# Patient Record
Sex: FEMALE | Race: WHITE | Employment: FULL TIME | URBAN - METROPOLITAN AREA
[De-identification: names, ages, dates, MRNs, and addresses within clinical notes are randomized per-mention and may not be internally consistent; named-entity substitution may affect disease eponyms.]

---

## 2017-02-17 ENCOUNTER — HOSPITAL ENCOUNTER (OUTPATIENT)
Dept: ULTRASOUND IMAGING | Age: 62
Discharge: HOME | End: 2017-02-17
Payer: COMMERCIAL

## 2017-02-17 DIAGNOSIS — N95.0 POSTMENOPAUSAL VAGINAL BLEEDING: ICD-10-CM

## 2017-02-17 DIAGNOSIS — N95.0 POSTMENOPAUSAL BLEEDING: ICD-10-CM

## 2017-02-17 PROCEDURE — 76856 US EXAM PELVIC COMPLETE: CPT | Performed by: RADIOLOGY

## 2017-02-17 PROCEDURE — 76856 US EXAM PELVIC COMPLETE: CPT

## 2017-02-17 PROCEDURE — 76830 TRANSVAGINAL US NON-OB: CPT

## 2017-02-17 PROCEDURE — 76830 TRANSVAGINAL US NON-OB: CPT | Performed by: RADIOLOGY

## 2019-04-20 ENCOUNTER — APPOINTMENT (OUTPATIENT)
Dept: CT IMAGING | Age: 64
End: 2019-04-20
Payer: COMMERCIAL

## 2019-04-20 ENCOUNTER — HOSPITAL ENCOUNTER (EMERGENCY)
Age: 64
Discharge: HOME | End: 2019-04-20
Attending: EMERGENCY MEDICINE
Payer: COMMERCIAL

## 2019-04-20 ENCOUNTER — APPOINTMENT (OUTPATIENT)
Dept: GENERAL RADIOLOGY | Age: 64
End: 2019-04-20
Payer: COMMERCIAL

## 2019-04-20 VITALS
SYSTOLIC BLOOD PRESSURE: 147 MMHG | OXYGEN SATURATION: 97 % | WEIGHT: 205 LBS | HEART RATE: 58 BPM | DIASTOLIC BLOOD PRESSURE: 73 MMHG | RESPIRATION RATE: 15 BRPM | HEIGHT: 65 IN | BODY MASS INDEX: 34.16 KG/M2 | TEMPERATURE: 97.9 F

## 2019-04-20 DIAGNOSIS — R42 DIZZINESS: Primary | ICD-10-CM

## 2019-04-20 DIAGNOSIS — N30.00 ACUTE CYSTITIS WITHOUT HEMATURIA: ICD-10-CM

## 2019-04-20 LAB
-: ABNORMAL
ABSOLUTE EOS #: 0.1 K/UL (ref 0–0.4)
ABSOLUTE IMMATURE GRANULOCYTE: ABNORMAL K/UL (ref 0–0.3)
ABSOLUTE LYMPH #: 0.7 K/UL (ref 1–4.8)
ABSOLUTE MONO #: 0.3 K/UL (ref 0.1–1.3)
ALBUMIN SERPL-MCNC: 3.9 G/DL (ref 3.5–5.2)
ALBUMIN/GLOBULIN RATIO: ABNORMAL (ref 1–2.5)
ALP BLD-CCNC: 104 U/L (ref 35–104)
ALT SERPL-CCNC: 11 U/L (ref 5–33)
AMORPHOUS: ABNORMAL
ANION GAP SERPL CALCULATED.3IONS-SCNC: 11 MMOL/L (ref 9–17)
AST SERPL-CCNC: 16 U/L
BACTERIA: ABNORMAL
BASOPHILS # BLD: 1 % (ref 0–2)
BASOPHILS ABSOLUTE: 0 K/UL (ref 0–0.2)
BILIRUB SERPL-MCNC: 0.54 MG/DL (ref 0.3–1.2)
BILIRUBIN URINE: NEGATIVE
BNP INTERPRETATION: NORMAL
BUN BLDV-MCNC: 17 MG/DL (ref 8–23)
BUN/CREAT BLD: ABNORMAL (ref 9–20)
CALCIUM SERPL-MCNC: 9.5 MG/DL (ref 8.6–10.4)
CASTS UA: ABNORMAL /LPF
CHLORIDE BLD-SCNC: 109 MMOL/L (ref 98–107)
CO2: 23 MMOL/L (ref 20–31)
COLOR: YELLOW
COMMENT UA: ABNORMAL
CREAT SERPL-MCNC: 0.71 MG/DL (ref 0.5–0.9)
CRYSTALS, UA: ABNORMAL /HPF
DIFFERENTIAL TYPE: ABNORMAL
EOSINOPHILS RELATIVE PERCENT: 2 % (ref 0–4)
EPITHELIAL CELLS UA: ABNORMAL /HPF
GFR AFRICAN AMERICAN: >60 ML/MIN
GFR NON-AFRICAN AMERICAN: >60 ML/MIN
GFR SERPL CREATININE-BSD FRML MDRD: ABNORMAL ML/MIN/{1.73_M2}
GFR SERPL CREATININE-BSD FRML MDRD: ABNORMAL ML/MIN/{1.73_M2}
GLUCOSE BLD-MCNC: 119 MG/DL (ref 70–99)
GLUCOSE BLD-MCNC: 123 MG/DL (ref 65–105)
GLUCOSE URINE: NEGATIVE
HCT VFR BLD CALC: 43.1 % (ref 36–46)
HEMOGLOBIN: 14.6 G/DL (ref 12–16)
IMMATURE GRANULOCYTES: ABNORMAL %
INR BLD: 1
KETONES, URINE: NEGATIVE
LEUKOCYTE ESTERASE, URINE: ABNORMAL
LIPASE: 34 U/L (ref 13–60)
LYMPHOCYTES # BLD: 16 % (ref 24–44)
MCH RBC QN AUTO: 31.5 PG (ref 26–34)
MCHC RBC AUTO-ENTMCNC: 33.8 G/DL (ref 31–37)
MCV RBC AUTO: 93 FL (ref 80–100)
MONOCYTES # BLD: 6 % (ref 1–7)
MUCUS: ABNORMAL
NITRITE, URINE: POSITIVE
NRBC AUTOMATED: ABNORMAL PER 100 WBC
OTHER OBSERVATIONS UA: ABNORMAL
PDW BLD-RTO: 13.3 % (ref 11.5–14.9)
PH UA: 7.5 (ref 5–8)
PLATELET # BLD: 172 K/UL (ref 150–450)
PLATELET ESTIMATE: ABNORMAL
PMV BLD AUTO: 8.7 FL (ref 6–12)
POTASSIUM SERPL-SCNC: 4 MMOL/L (ref 3.7–5.3)
PRO-BNP: 182 PG/ML
PROTEIN UA: NEGATIVE
PROTHROMBIN TIME: 12.8 SEC (ref 11.8–14.6)
RBC # BLD: 4.64 M/UL (ref 4–5.2)
RBC # BLD: ABNORMAL 10*6/UL
RBC UA: ABNORMAL /HPF
RENAL EPITHELIAL, UA: ABNORMAL /HPF
SEG NEUTROPHILS: 75 % (ref 36–66)
SEGMENTED NEUTROPHILS ABSOLUTE COUNT: 3.4 K/UL (ref 1.3–9.1)
SODIUM BLD-SCNC: 143 MMOL/L (ref 135–144)
SPECIFIC GRAVITY UA: 1.01 (ref 1–1.03)
TOTAL PROTEIN: 7.2 G/DL (ref 6.4–8.3)
TRICHOMONAS: ABNORMAL
TROPONIN INTERP: NORMAL
TROPONIN T: NORMAL NG/ML
TROPONIN, HIGH SENSITIVITY: 8 NG/L (ref 0–14)
TURBIDITY: ABNORMAL
URINE HGB: ABNORMAL
UROBILINOGEN, URINE: NORMAL
WBC # BLD: 4.4 K/UL (ref 3.5–11)
WBC # BLD: ABNORMAL 10*3/UL
WBC UA: ABNORMAL /HPF
YEAST: ABNORMAL

## 2019-04-20 PROCEDURE — 93005 ELECTROCARDIOGRAM TRACING: CPT

## 2019-04-20 PROCEDURE — 70450 CT HEAD/BRAIN W/O DYE: CPT

## 2019-04-20 PROCEDURE — 6360000002 HC RX W HCPCS: Performed by: PHYSICIAN ASSISTANT

## 2019-04-20 PROCEDURE — 6370000000 HC RX 637 (ALT 250 FOR IP): Performed by: PHYSICIAN ASSISTANT

## 2019-04-20 PROCEDURE — 80053 COMPREHEN METABOLIC PANEL: CPT

## 2019-04-20 PROCEDURE — 85610 PROTHROMBIN TIME: CPT

## 2019-04-20 PROCEDURE — 85025 COMPLETE CBC W/AUTO DIFF WBC: CPT

## 2019-04-20 PROCEDURE — 99284 EMERGENCY DEPT VISIT MOD MDM: CPT

## 2019-04-20 PROCEDURE — 84484 ASSAY OF TROPONIN QUANT: CPT

## 2019-04-20 PROCEDURE — 96374 THER/PROPH/DIAG INJ IV PUSH: CPT

## 2019-04-20 PROCEDURE — 2580000003 HC RX 258: Performed by: PHYSICIAN ASSISTANT

## 2019-04-20 PROCEDURE — 83690 ASSAY OF LIPASE: CPT

## 2019-04-20 PROCEDURE — 82947 ASSAY GLUCOSE BLOOD QUANT: CPT

## 2019-04-20 PROCEDURE — 81001 URINALYSIS AUTO W/SCOPE: CPT

## 2019-04-20 PROCEDURE — 36415 COLL VENOUS BLD VENIPUNCTURE: CPT

## 2019-04-20 PROCEDURE — 83880 ASSAY OF NATRIURETIC PEPTIDE: CPT

## 2019-04-20 PROCEDURE — 71046 X-RAY EXAM CHEST 2 VIEWS: CPT

## 2019-04-20 RX ORDER — ONDANSETRON 4 MG/1
4 TABLET, FILM COATED ORAL EVERY 8 HOURS PRN
Qty: 10 TABLET | Refills: 0 | Status: SHIPPED | OUTPATIENT
Start: 2019-04-20 | End: 2020-09-21 | Stop reason: ALTCHOICE

## 2019-04-20 RX ORDER — 0.9 % SODIUM CHLORIDE 0.9 %
1000 INTRAVENOUS SOLUTION INTRAVENOUS ONCE
Status: COMPLETED | OUTPATIENT
Start: 2019-04-20 | End: 2019-04-20

## 2019-04-20 RX ORDER — ONDANSETRON 2 MG/ML
4 INJECTION INTRAMUSCULAR; INTRAVENOUS ONCE
Status: COMPLETED | OUTPATIENT
Start: 2019-04-20 | End: 2019-04-20

## 2019-04-20 RX ORDER — MECLIZINE HCL 12.5 MG/1
12.5 TABLET ORAL 3 TIMES DAILY PRN
Qty: 12 TABLET | Refills: 0 | Status: SHIPPED | OUTPATIENT
Start: 2019-04-20 | End: 2019-04-30

## 2019-04-20 RX ORDER — DIAZEPAM 5 MG/1
5 TABLET ORAL ONCE
Status: COMPLETED | OUTPATIENT
Start: 2019-04-20 | End: 2019-04-20

## 2019-04-20 RX ORDER — NITROFURANTOIN 25; 75 MG/1; MG/1
100 CAPSULE ORAL 2 TIMES DAILY
Qty: 10 CAPSULE | Refills: 0 | Status: SHIPPED | OUTPATIENT
Start: 2019-04-20 | End: 2019-04-25

## 2019-04-20 RX ADMIN — DIAZEPAM 5 MG: 5 TABLET ORAL at 11:12

## 2019-04-20 RX ADMIN — ONDANSETRON 4 MG: 2 INJECTION INTRAMUSCULAR; INTRAVENOUS at 11:12

## 2019-04-20 RX ADMIN — SODIUM CHLORIDE 1000 ML: 9 INJECTION, SOLUTION INTRAVENOUS at 11:12

## 2019-04-20 ASSESSMENT — ENCOUNTER SYMPTOMS
VOMITING: 1
VISUAL CHANGE: 0
SHORTNESS OF BREATH: 0

## 2019-04-20 ASSESSMENT — PAIN DESCRIPTION - PAIN TYPE: TYPE: ACUTE PAIN

## 2019-04-20 ASSESSMENT — PAIN SCALES - GENERAL: PAINLEVEL_OUTOF10: 5

## 2019-04-20 ASSESSMENT — PAIN DESCRIPTION - LOCATION: LOCATION: HEAD

## 2019-04-20 NOTE — ED PROVIDER NOTES
[address]  eMERGENCY dEPARTMENT eNCOUnter      Pt Name: Aleksandra Pearce  MRN: [id number]  [place] 1955  Date of evaluation: 4/20/19      CHIEF COMPLAINT       Chief Complaint   Patient presents with    Dizziness     pt reports intermittent dizziness onset 2 months PTA    Emesis    Headache         HISTORY OF PRESENT ILLNESS    Aleksandra Pearce is a 71 y.o. female who presents complaining of dizziness and vomiting  The history is provided by the patient. Dizziness   Quality:  Head spinning and room spinning  Severity:  Mild  Onset quality:  Sudden  Duration:  4 hours  Timing:  Intermittent  Progression:  Waxing and waning  Chronicity:  Recurrent  Context: head movement    Context: not with loss of consciousness    Context comment:  Worse with closing eyes, lying down  Relieved by:  Being still  Worsened by:  Nothing  Ineffective treatments:  None tried  Associated symptoms: headaches and vomiting    Associated symptoms: no shortness of breath and no vision changes        REVIEW OF SYSTEMS       Review of Systems   Respiratory: Negative for shortness of breath. Gastrointestinal: Positive for vomiting. Neurological: Positive for dizziness and headaches. All other systems reviewed and are negative. PAST MEDICAL HISTORY   History reviewed. No pertinent past medical history. SURGICAL HISTORY       Past Surgical History:   Procedure Laterality Date    HYSTEROSCOPY  04/05/2017   [facility]GERY         CURRENT MEDICATIONS       Previous Medications    No medications on file       ALLERGIES     is allergic to penicillins. FAMILY HISTORY     has no family status information on file. SOCIAL HISTORY      reports that she has never smoked. She has never used smokeless tobacco. She reports that she does not drink alcohol or use drugs.     PHYSICAL EXAM     INITIAL VITALS: BP (!) 147/73   Pulse 58   Temp 97.9 °F (36.6 °C) (Oral)   Resp 15   Ht 5' 5\" (1.651 m)   Wt 205 lb (93 kg)   SpO2 97% BMI 34.11 kg/m²      Physical Exam   Constitutional: She is oriented to person, place, and time. She appears well-developed. No distress. HENT:   Head: Normocephalic. Right Ear: Hearing, tympanic membrane, external ear and ear canal normal.   Left Ear: Hearing, tympanic membrane, external ear and ear canal normal.   Mouth/Throat: Uvula is midline, oropharynx is clear and moist and mucous membranes are normal. No oropharyngeal exudate, posterior oropharyngeal edema, posterior oropharyngeal erythema or tonsillar abscesses. Eyes: Pupils are equal, round, and reactive to light. EOM are normal.   Neck: Normal range of motion. Cardiovascular: Normal rate, regular rhythm, normal heart sounds and intact distal pulses. Pulmonary/Chest: Effort normal. No respiratory distress. Abdominal: Soft. Bowel sounds are normal.   Musculoskeletal: Normal range of motion. She exhibits no edema or tenderness. Lymphadenopathy:     She has no cervical adenopathy. Neurological: She is alert and oriented to person, place, and time. She displays normal reflexes. No cranial nerve deficit or sensory deficit. She exhibits normal muscle tone. Coordination normal.   Skin: Skin is warm and dry. Capillary refill takes less than 2 seconds. She is not diaphoretic. Nursing note and vitals reviewed. MEDICAL DECISION MAKING:     Reexamination patient reports she feels much better and she was able to tolerate lying flat walking to the restroom. While in the emergency department she is given IV fluids and Zofran and Valium. At this time I feel is safe to discharge patient home diagnosis of vertigo we will start her on meclizine 1 tablet 3 times a day as needed for dizziness also start her on Zofran as needed for nausea. Recommend close follow-up with primary care provider rise from seated position or change position slowly.   Increase clear liquids and return to the emergency department for any worsening of symptoms or any other concerns. DIAGNOSTIC RESULTS     EKG: All EKG's are interpreted by the Emergency Department Physician who either signs or Co-signs this chart in the absence of acardiologist.    Narrative   Performed by: [facility]   Sinus bradycardia  Otherwise normal ECG  When compared with ECG of 27-MAR-2011 12:58,  No significant change was found   Lab and Collection         RADIOLOGY:Allplain film, CT, MRI, and formal ultrasound images (except ED bedside ultrasound) are read by the radiologist and the images and interpretations are directly viewed by the emergency physician. LABS:All lab results were reviewed by myself, and all abnormals are listed below.   Labs Reviewed   CBC WITH AUTO DIFFERENTIAL - Abnormal; Notable for the following components:       Result Value    Seg Neutrophils 75 (*)     Lymphocytes 16 (*)     Absolute Lymph # 0.70 (*)     All other components within normal limits   COMPREHENSIVE METABOLIC PANEL W/ REFLEX TO MG FOR LOW K - Abnormal; Notable for the following components:    Glucose 119 (*)     Chloride 109 (*)     All other components within normal limits   URINALYSIS - Abnormal; Notable for the following components:    Turbidity UA CLOUDY (*)     Urine Hgb TRACE (*)     Nitrite, Urine POSITIVE (*)     Leukocyte Esterase, Urine LARGE (*)     All other components within normal limits   MICROSCOPIC URINALYSIS - Abnormal; Notable for the following components:    Bacteria, UA MODERATE (*)     Mucus, UA 1+ (*)     All other components within normal limits   POC GLUCOSE FINGERSTICK - Abnormal; Notable for the following components:    POC Glucose 123 (*)     All other components within normal limits   LIPASE   TROPONIN   PROTIME-INR   BRAIN NATRIURETIC PEPTIDE         EMERGENCY DEPARTMENT COURSE:   Vitals:    Vitals:    04/20/19 1024 04/20/19 1143   BP: (!) 147/73    Pulse: 58    Resp: 15    Temp: 97.9 °F (36.6 °C)    TempSrc: Oral    SpO2: 96% 97%   Weight: 205 lb (93 kg)    Height: 5' 5\" (1.651 m)        The patient was given the following medications while in the emergency department:  Orders Placed This Encounter   Medications    0.9 % sodium chloride bolus    ondansetron (ZOFRAN) injection 4 mg    diazepam (VALIUM) tablet 5 mg    meclizine (ANTIVERT) 12.5 MG tablet     Sig: Take 1 tablet by mouth 3 times daily as needed for Dizziness or Nausea     Dispense:  12 tablet     Refill:  0    ondansetron (ZOFRAN) 4 MG tablet     Sig: Take 1 tablet by mouth every 8 hours as needed for Nausea     Dispense:  10 tablet     Refill:  0    nitrofurantoin, macrocrystal-monohydrate, (MACROBID) 100 MG capsule     Sig: Take 1 capsule by mouth 2 times daily for 5 days     Dispense:  10 capsule     Refill:  0       -------------------------      CRITICAL CARE:    CONSULTS:  None    PROCEDURES:  Procedures    FINAL IMPRESSION      1. Dizziness    2.  Acute cystitis without hematuria          DISPOSITION/PLAN   DISPOSITION        PATIENT REFERREDTO:  Ed Shultz, [address] [facility]  [address] [place] [address]  [phone]    Schedule an appointment as soon as possible for a visit in 2 days      [address] [place], [place] [address] [phone]    If symptoms worsen      DISCHARGEMEDICATIONS:  New Prescriptions    MECLIZINE (ANTIVERT) 12.5 MG TABLET    Take 1 tablet by mouth 3 times daily as needed for Dizziness or Nausea    NITROFURANTOIN, MACROCRYSTAL-MONOHYDRATE, (MACROBID) 100 MG CAPSULE    Take 1 capsule by mouth 2 times daily for 5 days    ONDANSETRON (ZOFRAN) 4 MG TABLET    Take 1 tablet by mouth every 8 hours as needed for Nausea       (Please note that portions of this note were completed with a voice recognition program.  Efforts were made to edit thedictations but occasionally words are mis-transcribed.)    SUMAN Beltran PA-C  04/20/19 [facility]  04/20/19 1686

## 2019-05-15 LAB
EKG ATRIAL RATE: 50 BPM
EKG P AXIS: 67 DEGREES
EKG P-R INTERVAL: 182 MS
EKG Q-T INTERVAL: 454 MS
EKG QRS DURATION: 94 MS
EKG QTC CALCULATION (BAZETT): 413 MS
EKG R AXIS: -3 DEGREES
EKG T AXIS: 59 DEGREES
EKG VENTRICULAR RATE: 50 BPM

## 2019-06-04 ENCOUNTER — HOSPITAL ENCOUNTER (OUTPATIENT)
Dept: PHYSICAL THERAPY | Age: 64
Setting detail: THERAPIES SERIES
Discharge: HOME | End: 2019-06-04
Payer: COMMERCIAL

## 2019-06-04 PROCEDURE — 97162 PT EVAL MOD COMPLEX 30 MIN: CPT

## 2019-06-04 PROCEDURE — 97110 THERAPEUTIC EXERCISES: CPT

## 2019-06-04 PROCEDURE — 97530 THERAPEUTIC ACTIVITIES: CPT

## 2019-06-04 NOTE — PROGRESS NOTES
Other: CT OF HEAD 4/20/2019: NO ACUTE INTRACRANIAL ABNORMALITY. Medications: [x] Refer to full medical record [] None [x] Other:GOT RX FOR ANTIVERT BUT NOT TAKING  Allergies:      [x] Refer to full medical record: MEDIATION [] None [] Other:    Working:  [] Normal Duty  [] Light Duty  [] Off D/T Condition  [] Retired  [] Not Employed                  []  Disability  [x] Other: OFF FOR SUMMER BREAK          Return to work: [address] [employer]   Job/ADL Description: WORKS FOR [employer] IN [employer] AND IS OFF [employer]. SHE DENIES SYMPTOMS WHILE DRIVING. SHE LIVES WITH HER  IN A HOUSE WITH STEPS TO ENTER AND A BASEMENT. SHE IS DOING WELL ON THE STAIRS. PATIENT REPORTS SHE IS ACTIVE BUT IS NOT AN EXERCISER. Pain:  [x] Yes  [] No Location: LEFT SIDE HEAD PRESSURE X1 WEEK Pain Rating: (0-10 scale) 1/10    Precautions: FALL RISK  Fall History: 3 FALLS IN THE LAST ONE YEAR, FALLS OCCUR WHEN SHE HAS VERTIGO AND TURNS HER HEAD RESULTING IN LOSS OF BALANCE.     Objective:    OBSERVATION No Deficit Deficit Not Tested Comments   Posture       Forward Head [] [x] [] LATERALLY FLEXED LEFT   Rounded Shoulders [] [x] [] R>L   Kyphosis [] [x] [] INCREASED   Lordosis [x] [] []    Lateral Shift [x] [] []    Scoliosis [x] [] []    Iliac Crest [x] [] []    PSIS [x] [] []    ASIS [] [] [x]    Genu Valgus [x] [] []    Genu Varus [x] [] []    Genu Recurvatum [x] [] []    Sensation [x] [] [] DENIES NUMBNESS   Edema [] [x] [] LE   Neurological Signs [] [x] [] SLOW L LE FELIX, DIFFICULTY WITH SHIN SLIDE LEFT, GOOD UE FELIX AND FINGER TO NOSE   Strength   X    Gait  X  SLOW, ANTALGIC, NO ASSISTIVE DEVICE     COMMENTS:  PTOSIS LEFT EYE       AROM Comments   Cervical  REPORTS CREPITUS AND STIFFNESS WITH AROM, ROM IS SLOW AND GUARDED DUE TO FEAR OF VERTIGO   Flexion 75% OF NORMAL REPORTS MILD DIZZINESS   Extension 50%    Rotation (L) 50% (R) 50%    Sidebend (L) 50% (R) 50%    Retraction NOT TESTED      Special Testing: [x] DIZZINESS HANDICAP INVENTORY ([address] [place] [address]): 34/100   [x] ACTIVITIES BALANCE CONFIDENCE SCALE ([facility]): 68.75% CONFIDENT  [] Other:      VESTIBULAR SPECIAL TESTS:      ALAR LIGAMENT (-) LAXITY   TRANSVERSE LIGAMENT/ SHARP ZAYNAB (-) LAXITY   NECK TORSION TEST NOT TESTED   Vitals    NOT TESTED   SITTING             BP:                         HR:      SIT->STAND      BP:                         HR:      SIT->SUPINE     BP:                         HR:      SUPINE->SIT     BP:                         HR:             VESTIBULAR BALANCE SCREEN:    TEST EYES OPEN EYES CLOSED   ROMBERG >30' WITH MILD TO MODERATE POSTURAL SWAY >30' WITH MODERATE POSTURAL SWAY   TANDEM ROMBERG NOT TESTED NT   FEET TOGETHER: FOAM SURFACE >30' WITH EXCESSIVE POSTURAL SWAY AND HIP BALANCE STRATEGY OBSERVED 23\" WITH EXCESSIVE POSTURAL SWAY AND LOSS OF BALANCE.      ONE LEGGED STANCE       FUNCTIONAL GAIT ASSESSMENT (FGA): TEST NEXT SESSION IN INDICATED BY POSITION TESTS        VESTIBULAR OCULOMOTOR SCREENING:    TEST ROOM LIGHT WITHOUT FIXATION-IR GOGGLES   MODIFIED VERTEBRAL ARTERY TEST IN SITTING NO REPORTS OF SYMPTOMS    SPONTANEOUS NYSTAGMUS NEGATIVE    GAZE HOLDING NYSTAGMUS (L) NEG (L) MILD DOWN BEATING NYSTAGMUS    (R) NEG (R) MILD DOWN BEATING NYSTAGMUS    UP: NEG UP: MILD DOWN BEATING NYSTAGMUS   SMOOTH PURSUIT NEG    SACCADES NEG    VOR CANCELLATION NEG    VOR TO SLOW HEAD MOVEMENT NEG    VOR TO FAST HEAD MOVEMENT(HEAD THRUST) (L) NOT TESTED DUE TO CERVICAL SYMPTOMS     (R)  NT    HEAD SHAKING NYSTAGMUS  NOT TESTED DUE TO BASELINE OF SPONTANEOUS NYSTAGMUS WITH FIXATION REMOVED   VALSALVA/HYPER VENTILATION  NEG/NEG   RIGHT LUIS FELIPE-HALLPIKE  PATIENT STATES SHE IS UNABLE TO TOLERATE THE TEST POSITION   LEFT LUIS FELIPE-HALLPIKE  PATIENT STATES SHE IS UNABLE TO TOLERATE THE TEST POSITION   SUPINE HEAD CENTER  PATIENT STATES SHE IS UNABLE TO TOLERATE THE TEST POSITION   ROLL TEST (L)  (L) PATIENT STATES SHE IS UNABLE TO TOLERATE THE TEST POSITION    (R)  (R) PATIENT STATES SHE IS UNABLE TO TOLERATE THE TEST POSITION   BOW AND LEAN TEST  BOW: NEGATIVE FOR BPPV, PATIENT REPORTED BRIEF VERTIGO  LEAN: NEGATIVE FOR BPPV   SIDE LYING TEST (L)  (L) PATIENT STATES SHE IS UNABLE TO TOLERATE THE TEST POSITION    (R)  (R) PATIENT STATES SHE IS UNABLE TO TOLERATE THE TEST POSITION   VISUAL ACUITY STATIC:  DYNAMIC: N/A AT THIS TIME       Assessment:  Problems:    [x] Vertigo:     [x] Difficulty Walking a Straight Course:    [x] Static Balance Deficit:    [x] Dynamic Balance deficit: Functional Gait Assessment   [x] Positive Kari Hallpike or Roll Test for BPPV: TBD (+) BPPV SYMPTOMS BUT PATIENT FEARFUL OF THE TEST  [x] ? Function: Dizziness Handicap Inventory: 34/100  [x] Gait Deviations:  [x] Other: (+) CENTRAL SIGN OF DOWN BEATING NYSTAGMUS, (+) MIGRAINE HEADACHES, (+) ANXIETY, (+) MOTION SENSITIVITY,       STG: (to be met in 3-6 treatments)  1. Patient will report 7 days with no vertigo  2. Patient will report 75% improvement in dizziness and imbalance  3. Improve balance: Increase Functional Gait Assessment (FGA) TBD  4. Negative Kari Hallpike and Roll Test for BPPV  5. Improve Function: Decrease Dizziness Handicap Inventory (DHI) (34/100) BY 20  6. Independent with Home Exercise Programs  7. Demonstrate Knowledge of fall prevention  LTG: (to be met in TBD treatments)  1. Patient goals: \"BE ABLE TO LAY DOWN FLAT, FEEL BETTER, STOP WIGGLING OF HEAD\"    Rehab Potential:  [x] Good  [] Fair  [] Poor   Suggested Professional Referral:  [x] No  [] Yes:  Barriers to Goal Achievement[de-identified]  [] No  [x] Yes: ANXIETY  Pt. Education:  [x] Plans/Goals, Risks/Benefits discussed, results of clinical tests    [x] Home exercise program  Method of Education: [x] Verbal  [x] Demo  [x] Written  Comprehension of Education:  [x] Verbalizes understanding. [x] Demonstrates understanding. [] Needs Review.     Treatment Plan:  [] Therapeutic Exercise     [x] Vestibular Rehabilitation  [] Therapeutic Activity       [] Gait Training     [] Modalities      [] Neuromuscular Re-education [] Cold/hotpack   [] Instruction in HEP               [] Manual Therapy               [] Aquatic Therapy  [] Other:    Frequency: 1-2 x/weeks for 3-6 visits    Todays Treatment:  Exercises: HOME EXERCISE INSTRUCTION  Exercise Reps/ Time Weight/ Level Comments   CERVICAL AROM DUE TO STIFF NECK 5\" X5 EA  BID HEP                           Other: PATIENT EDUCATION REGARDING, POC. DISCUSSED HOW ANXIETY MAGNIFIES SYMPTOMS. DISCUSSED MOTION SENSITIVITY MAKES VERTIGO FEEL MORE INTENSE. Specific Instructions for next treatment:  PATIENT WAS INSTRUCTED TO RETURN NEXT APPOINTMENT WITH HER SPOUSE FOR EMOTIONAL SUPPORT AND TO BE THE . DISCUSSED PREMEDICATION (30') PRIOR TO THE APPOINTMENT WITH ANTI-EMETICS AND/OR VALIUM WHICH MAY HELP HER TOLERATED THE VERTIGO AND NAUSEA AND PREVENT VOMITING. SHE PLANS TO DISCUSS THIS WITH HER PCP. Treatment Charges: Mins Units   [x] Evaluation       []  Low       [x]  Moderate       []  High  1   []  Canalith Repositioning Maneuver     [x]  Ther Exercise 10 1   []  Manual Therapy     [x]  Ther Activities 15 1   []  Neuromuscular     []  Other     TOTAL 80 3     Time in: 8490     Time out: 0549    Electronically signed by: Toi Aschoff, PT    Physician Signature:________________________________Date:__________________  By signing above or cosigning this note, I have reviewed this plan of care and certify a need for medically necessary rehabilitation services.

## 2019-06-07 ENCOUNTER — HOSPITAL ENCOUNTER (OUTPATIENT)
Age: 64
Discharge: HOME | End: 2019-06-07
Payer: COMMERCIAL

## 2019-06-07 DIAGNOSIS — Z13.1 ENCOUNTER FOR SCREENING FOR DIABETES MELLITUS: ICD-10-CM

## 2019-06-07 DIAGNOSIS — Z13.220 SCREENING FOR HYPERLIPIDEMIA: ICD-10-CM

## 2019-06-07 LAB
CHOLESTEROL, FASTING: 211 MG/DL
CHOLESTEROL/HDL RATIO: 3.4
GLUCOSE FASTING: 88 MG/DL (ref 70–99)
HDLC SERPL-MCNC: 62 MG/DL
LDL CHOLESTEROL: 129 MG/DL (ref 0–130)
TRIGLYCERIDE, FASTING: 100 MG/DL
VLDLC SERPL CALC-MCNC: ABNORMAL MG/DL (ref 1–30)

## 2019-06-07 PROCEDURE — 82947 ASSAY GLUCOSE BLOOD QUANT: CPT

## 2019-06-07 PROCEDURE — 36415 COLL VENOUS BLD VENIPUNCTURE: CPT

## 2019-06-07 PROCEDURE — 80061 LIPID PANEL: CPT

## 2019-06-13 ENCOUNTER — APPOINTMENT (OUTPATIENT)
Dept: PHYSICAL THERAPY | Age: 64
End: 2019-06-13
Payer: COMMERCIAL

## 2020-07-03 ENCOUNTER — APPOINTMENT (OUTPATIENT)
Dept: GENERAL RADIOLOGY | Age: 65
End: 2020-07-03
Payer: COMMERCIAL

## 2020-07-03 ENCOUNTER — HOSPITAL ENCOUNTER (OUTPATIENT)
Age: 65
Setting detail: OBSERVATION
Discharge: HOME | End: 2020-07-04
Attending: EMERGENCY MEDICINE | Admitting: FAMILY MEDICINE
Payer: COMMERCIAL

## 2020-07-03 PROBLEM — R07.9 CHEST PAIN: Status: ACTIVE | Noted: 2020-07-03

## 2020-07-03 LAB
ABSOLUTE EOS #: 0.2 K/UL (ref 0–0.4)
ABSOLUTE IMMATURE GRANULOCYTE: ABNORMAL K/UL (ref 0–0.3)
ABSOLUTE LYMPH #: 1.2 K/UL (ref 1–4.8)
ABSOLUTE MONO #: 0.5 K/UL (ref 0.1–1.3)
ALBUMIN SERPL-MCNC: 3.9 G/DL (ref 3.5–5.2)
ALBUMIN/GLOBULIN RATIO: ABNORMAL (ref 1–2.5)
ALP BLD-CCNC: 113 U/L (ref 35–104)
ALT SERPL-CCNC: 13 U/L (ref 5–33)
ANION GAP SERPL CALCULATED.3IONS-SCNC: 10 MMOL/L (ref 9–17)
AST SERPL-CCNC: 19 U/L
BASOPHILS # BLD: 1 % (ref 0–2)
BASOPHILS ABSOLUTE: 0 K/UL (ref 0–0.2)
BILIRUB SERPL-MCNC: 0.44 MG/DL (ref 0.3–1.2)
BNP INTERPRETATION: NORMAL
BUN BLDV-MCNC: 17 MG/DL (ref 8–23)
BUN/CREAT BLD: ABNORMAL (ref 9–20)
C-REACTIVE PROTEIN: 5.4 MG/L (ref 0–5)
CALCIUM SERPL-MCNC: 9.2 MG/DL (ref 8.6–10.4)
CHLORIDE BLD-SCNC: 103 MMOL/L (ref 98–107)
CO2: 26 MMOL/L (ref 20–31)
CREAT SERPL-MCNC: 0.71 MG/DL (ref 0.5–0.9)
D-DIMER QUANTITATIVE: 0.33 MG/L FEU (ref 0–0.59)
DIFFERENTIAL TYPE: ABNORMAL
EOSINOPHILS RELATIVE PERCENT: 4 % (ref 0–4)
GFR AFRICAN AMERICAN: >60 ML/MIN
GFR NON-AFRICAN AMERICAN: >60 ML/MIN
GFR SERPL CREATININE-BSD FRML MDRD: ABNORMAL ML/MIN/{1.73_M2}
GFR SERPL CREATININE-BSD FRML MDRD: ABNORMAL ML/MIN/{1.73_M2}
GLUCOSE BLD-MCNC: 130 MG/DL (ref 70–99)
HCT VFR BLD CALC: 43.8 % (ref 36–46)
HEMOGLOBIN: 14.6 G/DL (ref 12–16)
IMMATURE GRANULOCYTES: ABNORMAL %
INR BLD: 1
LIPASE: 37 U/L (ref 13–60)
LYMPHOCYTES # BLD: 22 % (ref 24–44)
MCH RBC QN AUTO: 31.3 PG (ref 26–34)
MCHC RBC AUTO-ENTMCNC: 33.3 G/DL (ref 31–37)
MCV RBC AUTO: 94 FL (ref 80–100)
MONOCYTES # BLD: 9 % (ref 1–7)
NRBC AUTOMATED: ABNORMAL PER 100 WBC
PDW BLD-RTO: 13.5 % (ref 11.5–14.9)
PLATELET # BLD: 182 K/UL (ref 150–450)
PLATELET ESTIMATE: ABNORMAL
PMV BLD AUTO: 8.3 FL (ref 6–12)
POTASSIUM SERPL-SCNC: 4.2 MMOL/L (ref 3.7–5.3)
PRO-BNP: 45 PG/ML
PROTHROMBIN TIME: 12.9 SEC (ref 11.8–14.6)
RBC # BLD: 4.66 M/UL (ref 4–5.2)
RBC # BLD: ABNORMAL 10*6/UL
SEG NEUTROPHILS: 64 % (ref 36–66)
SEGMENTED NEUTROPHILS ABSOLUTE COUNT: 3.5 K/UL (ref 1.3–9.1)
SODIUM BLD-SCNC: 139 MMOL/L (ref 135–144)
TOTAL PROTEIN: 7.3 G/DL (ref 6.4–8.3)
TROPONIN INTERP: NORMAL
TROPONIN INTERP: NORMAL
TROPONIN T: NORMAL NG/ML
TROPONIN T: NORMAL NG/ML
TROPONIN, HIGH SENSITIVITY: 7 NG/L (ref 0–14)
TROPONIN, HIGH SENSITIVITY: 7 NG/L (ref 0–14)
WBC # BLD: 5.5 K/UL (ref 3.5–11)
WBC # BLD: ABNORMAL 10*3/UL

## 2020-07-03 PROCEDURE — G0378 HOSPITAL OBSERVATION PER HR: HCPCS

## 2020-07-03 PROCEDURE — 6370000000 HC RX 637 (ALT 250 FOR IP): Performed by: PHYSICIAN ASSISTANT

## 2020-07-03 PROCEDURE — 71045 X-RAY EXAM CHEST 1 VIEW: CPT

## 2020-07-03 PROCEDURE — 85025 COMPLETE CBC W/AUTO DIFF WBC: CPT

## 2020-07-03 PROCEDURE — 73030 X-RAY EXAM OF SHOULDER: CPT

## 2020-07-03 PROCEDURE — 83690 ASSAY OF LIPASE: CPT

## 2020-07-03 PROCEDURE — 93005 ELECTROCARDIOGRAM TRACING: CPT | Performed by: PHYSICIAN ASSISTANT

## 2020-07-03 PROCEDURE — 2580000003 HC RX 258: Performed by: PHYSICIAN ASSISTANT

## 2020-07-03 PROCEDURE — 85379 FIBRIN DEGRADATION QUANT: CPT

## 2020-07-03 PROCEDURE — 86140 C-REACTIVE PROTEIN: CPT

## 2020-07-03 PROCEDURE — 36415 COLL VENOUS BLD VENIPUNCTURE: CPT

## 2020-07-03 PROCEDURE — 99285 EMERGENCY DEPT VISIT HI MDM: CPT

## 2020-07-03 PROCEDURE — 83880 ASSAY OF NATRIURETIC PEPTIDE: CPT

## 2020-07-03 PROCEDURE — 80053 COMPREHEN METABOLIC PANEL: CPT

## 2020-07-03 PROCEDURE — 85610 PROTHROMBIN TIME: CPT

## 2020-07-03 PROCEDURE — 6370000000 HC RX 637 (ALT 250 FOR IP): Performed by: FAMILY MEDICINE

## 2020-07-03 PROCEDURE — 84484 ASSAY OF TROPONIN QUANT: CPT

## 2020-07-03 RX ORDER — ASPIRIN 81 MG/1
324 TABLET, CHEWABLE ORAL DAILY
Status: DISCONTINUED | OUTPATIENT
Start: 2020-07-03 | End: 2020-07-04 | Stop reason: HOSPADM

## 2020-07-03 RX ORDER — LIDOCAINE 4 G/G
1 PATCH TOPICAL DAILY
Status: DISCONTINUED | OUTPATIENT
Start: 2020-07-03 | End: 2020-07-04 | Stop reason: HOSPADM

## 2020-07-03 RX ORDER — ONDANSETRON 4 MG/1
4 TABLET, FILM COATED ORAL EVERY 8 HOURS PRN
Status: DISCONTINUED | OUTPATIENT
Start: 2020-07-03 | End: 2020-07-04 | Stop reason: HOSPADM

## 2020-07-03 RX ORDER — SODIUM CHLORIDE 9 MG/ML
1000 INJECTION, SOLUTION INTRAVENOUS CONTINUOUS
Status: DISCONTINUED | OUTPATIENT
Start: 2020-07-03 | End: 2020-07-04 | Stop reason: HOSPADM

## 2020-07-03 RX ORDER — MORPHINE SULFATE 2 MG/ML
2 INJECTION, SOLUTION INTRAMUSCULAR; INTRAVENOUS ONCE
Status: DISCONTINUED | OUTPATIENT
Start: 2020-07-03 | End: 2020-07-04 | Stop reason: HOSPADM

## 2020-07-03 RX ORDER — ASPIRIN 81 MG/1
324 TABLET, CHEWABLE ORAL ONCE
Status: COMPLETED | OUTPATIENT
Start: 2020-07-03 | End: 2020-07-03

## 2020-07-03 RX ORDER — ACETAMINOPHEN 325 MG/1
650 TABLET ORAL EVERY 4 HOURS PRN
Status: DISCONTINUED | OUTPATIENT
Start: 2020-07-03 | End: 2020-07-04 | Stop reason: HOSPADM

## 2020-07-03 RX ORDER — ONDANSETRON 4 MG/1
4 TABLET, ORALLY DISINTEGRATING ORAL EVERY 8 HOURS PRN
Status: DISCONTINUED | OUTPATIENT
Start: 2020-07-03 | End: 2020-07-04 | Stop reason: HOSPADM

## 2020-07-03 RX ORDER — NITROGLYCERIN 0.4 MG/1
0.4 TABLET SUBLINGUAL EVERY 5 MIN PRN
Status: DISCONTINUED | OUTPATIENT
Start: 2020-07-03 | End: 2020-07-04 | Stop reason: HOSPADM

## 2020-07-03 RX ORDER — SODIUM CHLORIDE 0.9 % (FLUSH) 0.9 %
10 SYRINGE (ML) INJECTION EVERY 12 HOURS SCHEDULED
Status: DISCONTINUED | OUTPATIENT
Start: 2020-07-03 | End: 2020-07-04 | Stop reason: HOSPADM

## 2020-07-03 RX ORDER — SODIUM CHLORIDE 0.9 % (FLUSH) 0.9 %
10 SYRINGE (ML) INJECTION PRN
Status: DISCONTINUED | OUTPATIENT
Start: 2020-07-03 | End: 2020-07-04 | Stop reason: HOSPADM

## 2020-07-03 RX ORDER — IBUPROFEN 800 MG/1
800 TABLET ORAL
Status: DISCONTINUED | OUTPATIENT
Start: 2020-07-04 | End: 2020-07-04 | Stop reason: HOSPADM

## 2020-07-03 RX ADMIN — ASPIRIN 81 MG CHEWABLE TABLET 324 MG: 81 TABLET CHEWABLE at 15:23

## 2020-07-03 RX ADMIN — NITROGLYCERIN 0.4 MG: 0.4 TABLET, ORALLY DISINTEGRATING SUBLINGUAL at 11:27

## 2020-07-03 RX ADMIN — ASPIRIN 81 MG 324 MG: 81 TABLET ORAL at 11:26

## 2020-07-03 RX ADMIN — SODIUM CHLORIDE 1000 ML: 9 INJECTION, SOLUTION INTRAVENOUS at 21:03

## 2020-07-03 RX ADMIN — SODIUM CHLORIDE 1000 ML: 9 INJECTION, SOLUTION INTRAVENOUS at 11:48

## 2020-07-03 RX ADMIN — NITROGLYCERIN 0.4 MG: 0.4 TABLET, ORALLY DISINTEGRATING SUBLINGUAL at 11:44

## 2020-07-03 ASSESSMENT — ENCOUNTER SYMPTOMS
NAUSEA: 1
ABDOMINAL PAIN: 0
COUGH: 0
BACK PAIN: 1
HEARTBURN: 1
SHORTNESS OF BREATH: 0
VOMITING: 0

## 2020-07-03 ASSESSMENT — PAIN DESCRIPTION - PAIN TYPE
TYPE: ACUTE PAIN
TYPE: ACUTE PAIN

## 2020-07-03 ASSESSMENT — PAIN DESCRIPTION - LOCATION
LOCATION: CHEST
LOCATION: CHEST

## 2020-07-03 ASSESSMENT — PAIN SCALES - GENERAL
PAINLEVEL_OUTOF10: 8
PAINLEVEL_OUTOF10: 7
PAINLEVEL_OUTOF10: 5

## 2020-07-03 ASSESSMENT — PAIN DESCRIPTION - PROGRESSION: CLINICAL_PROGRESSION: GRADUALLY IMPROVING

## 2020-07-03 ASSESSMENT — PAIN DESCRIPTION - ORIENTATION
ORIENTATION: LEFT
ORIENTATION: LEFT

## 2020-07-03 ASSESSMENT — PAIN DESCRIPTION - DESCRIPTORS: DESCRIPTORS: DISCOMFORT

## 2020-07-03 NOTE — ED PROVIDER NOTES
[address]  eMERGENCY dEPARTMENT eNCOUnter      Pt Name: Anthony Stephen  MRN: [id number]  [place] 1955  Date of evaluation: 7/3/20      CHIEF COMPLAINT       Chief Complaint   Patient presents with    Chest Pain         HISTORY OF PRESENT ILLNESS    Anthony Stephen is a 76 y.o. female who presents complaining of   The history is provided by the patient. Chest Pain   Pain location:  L chest  Pain quality: aching and pressure    Pain radiates to:  L shoulder and mid back  Pain severity:  Moderate  Onset quality:  Sudden  Duration:  3 hours  Timing:  Intermittent  Progression:  Waxing and waning  Chronicity:  New  Relieved by:  Nothing  Worsened by:  Nothing  Ineffective treatments:  None tried  Associated symptoms: back pain, heartburn and nausea    Associated symptoms: no abdominal pain, no cough, no diaphoresis, no palpitations, no shortness of breath, no syncope and no vomiting        REVIEW OF SYSTEMS       Review of Systems   Constitutional: Negative for diaphoresis. Respiratory: Negative for cough and shortness of breath. Cardiovascular: Positive for chest pain. Negative for palpitations and syncope. Gastrointestinal: Positive for heartburn and nausea. Negative for abdominal pain and vomiting. Musculoskeletal: Positive for back pain. All other systems reviewed and are negative. PAST MEDICAL HISTORY   History reviewed. No pertinent past medical history. SURGICAL HISTORY       Past Surgical History:   Procedure Laterality Date    HYSTEROSCOPY  04/05/2017   [facility] SURGERY         CURRENT MEDICATIONS       Previous Medications    ONDANSETRON (ZOFRAN) 4 MG TABLET    Take 1 tablet by mouth every 8 hours as needed for Nausea       ALLERGIES     is allergic to penicillins. FAMILY HISTORY     has no family status information on file. SOCIAL HISTORY      reports that she has never smoked.  She has never used smokeless tobacco. She reports that she does not drink alcohol or use drugs.    PHYSICAL EXAM     INITIAL VITALS: /63   Pulse 69   Temp 98.2 °F (36.8 °C) (Oral)   Resp 13   Ht 5' 5\" (1.651 m)   Wt 200 lb (90.7 kg)   SpO2 94%   BMI 33.28 kg/m²      Physical Exam  Vitals signs and nursing note reviewed. Constitutional:       Appearance: She is well-developed. HENT:      Head: Normocephalic and atraumatic. Neck:      Musculoskeletal: Normal range of motion. Cardiovascular:      Rate and Rhythm: Normal rate and regular rhythm. Pulses: Normal pulses. Heart sounds: Normal heart sounds. Comments: No calf pain or swelling  Pulmonary:      Effort: Pulmonary effort is normal.      Breath sounds: Normal breath sounds. Musculoskeletal: Normal range of motion. Right lower leg: No edema. Left lower leg: No edema. Skin:     General: Skin is warm and dry. Capillary Refill: Capillary refill takes less than 2 seconds. Neurological:      Mental Status: She is alert and oriented to person, place, and time. MEDICAL DECISION MAKING:     Risk factors include obesity, family history mother with heart disease. Onset of chest pain about 8:00 this morning came suddenly went away and then returned. She is also complaining of increased belching pain that radiates to the left shoulder and back area. While in the emergency department we ordered aspirin nitroglycerin morphine EKG was done which showed normal sinus rhythm there is no acute changes noted on EKG. We will order cardiac work-up chest x-ray and reevaluate the patient. At this time I believe the patient would likely be admitted for further cardiac work-up possible stress test.  Spoke with Dr. Jonel Lopez who is on-call who agreed to admit the patient for chest pain and further cardiac work-up. Labs are reviewed EKG reviewed chest x-ray reviewed patient resting comfortably at this time she is stable.   DIAGNOSTIC RESULTS     EKG: All EKG's are interpreted by the Emergency Department Physician who either signs or Co-signs this chart in the absence of acardiologist.  EKG shows normal sinus rhythm at 64 bpm there is no ST elevation ST depression T wave inversion noted [place] interval is 0.18 QRS 0.06      RADIOLOGY:Allplain film, CT, MRI, and formal ultrasound images (except ED bedside ultrasound) are read by the radiologist and the images and interpretations are directly viewed by the emergency physician. LABS:All lab results were reviewed by myself, and all abnormals are listed below. Labs Reviewed   CBC WITH AUTO DIFFERENTIAL - Abnormal; Notable for the following components:       Result Value    Lymphocytes 22 (*)     Monocytes 9 (*)     All other components within normal limits   COMPREHENSIVE METABOLIC PANEL W/ REFLEX TO MG FOR LOW K - Abnormal; Notable for the following components:    Glucose 130 (*)     Alkaline Phosphatase 113 (*)     All other components within normal limits   C-REACTIVE PROTEIN - Abnormal; Notable for the following components:    CRP 5.4 (*)     All other components within normal limits   LIPASE   TROPONIN   BRAIN NATRIURETIC PEPTIDE   PROTIME-INR   D-DIMER, QUANTITATIVE   TROPONIN         EMERGENCY DEPARTMENT COURSE:   Vitals:    Vitals:    07/03/20 1102 07/03/20 1144 07/03/20 1145 07/03/20 1200   BP: 118/88 113/70 127/73 105/63   Pulse: 69 62 63 69   Resp: 16 18 13    Temp: 98.2 °F (36.8 °C)      TempSrc: Oral      SpO2: 98% 96% 94% 94%   Weight: 200 lb (90.7 kg)      Height: 5' 5\" (1.651 m)          The patient was given the following medications while in the emergency department:  Orders Placed This Encounter   Medications    aspirin chewable tablet 324 mg    nitroGLYCERIN (NITROSTAT) SL tablet 0.4 mg    morphine (PF) injection 2 mg    0.9 % sodium chloride infusion       -------------------------      CRITICAL CARE:     CONSULTS:  IP CONSULT TO FAMILY MEDICINE    PROCEDURES:  Procedures    FINAL IMPRESSION      1.  Chest pain, unspecified type DISPOSITION/PLAN   DISPOSITION Admitted 07/03/2020 12:35:09 PM      PATIENT REFERREDTO:  Shaina Cooper, [address]  [address]  [address] [facility] [address] [phone]            DISCHARGEMEDICATIONS:  New Prescriptions    No medications on file       (Please note that portions of this note were completed with a voice recognition program.  Efforts were made to edit thedictations but occasionally words are mis-transcribed.)    SUMAN Altamirano PA-C  07/03/20 7540

## 2020-07-03 NOTE — ED PROVIDER NOTES
Additional findings are as noted.     Yoni Álvarez MD  Attending Emergency  Physician              Kulwant Gordon MD  07/03/20 2847

## 2020-07-03 NOTE — ED NOTES
Mode of arrival (squad #, walk in, police, etc) : walk in, from home        Chief complaint(s): Chest pain        Arrival Note (brief scenario, treatment PTA, etc). : Patient states that she woke up this morning and noticed that she had chest pain, she states the pain is worse with inhalation and movement and sometimes improves when belching. Patient states she has never experienced a pain like this before. Patient brought herself to ED, she is alert and oriented x4, respirations even non-labored.             Aida White RN  07/03/20 8686

## 2020-07-04 VITALS
BODY MASS INDEX: 33.32 KG/M2 | HEART RATE: 67 BPM | TEMPERATURE: 97.9 F | DIASTOLIC BLOOD PRESSURE: 69 MMHG | OXYGEN SATURATION: 99 % | HEIGHT: 65 IN | WEIGHT: 200 LBS | RESPIRATION RATE: 16 BRPM | SYSTOLIC BLOOD PRESSURE: 113 MMHG

## 2020-07-04 PROBLEM — M25.512 ACUTE PAIN OF LEFT SHOULDER: Status: ACTIVE | Noted: 2020-07-04

## 2020-07-04 PROBLEM — E66.9 OBESITY, CLASS I, BMI 30-34.9: Status: ACTIVE | Noted: 2020-07-04

## 2020-07-04 PROCEDURE — G0378 HOSPITAL OBSERVATION PER HR: HCPCS

## 2020-07-04 PROCEDURE — 2580000003 HC RX 258: Performed by: PHYSICIAN ASSISTANT

## 2020-07-04 PROCEDURE — 99220 PR INITIAL OBSERVATION CARE/DAY 70 MINUTES: CPT | Performed by: FAMILY MEDICINE

## 2020-07-04 PROCEDURE — 6370000000 HC RX 637 (ALT 250 FOR IP): Performed by: PHYSICIAN ASSISTANT

## 2020-07-04 PROCEDURE — 6370000000 HC RX 637 (ALT 250 FOR IP): Performed by: FAMILY MEDICINE

## 2020-07-04 RX ORDER — IBUPROFEN 800 MG/1
800 TABLET ORAL
Qty: 120 TABLET | Refills: 3 | Status: SHIPPED | OUTPATIENT
Start: 2020-07-04 | End: 2020-09-21 | Stop reason: ALTCHOICE

## 2020-07-04 RX ORDER — LIDOCAINE 4 G/G
1 PATCH TOPICAL DAILY
Qty: 30 PATCH | Refills: 2 | Status: SHIPPED | OUTPATIENT
Start: 2020-07-05 | End: 2020-09-21 | Stop reason: ALTCHOICE

## 2020-07-04 RX ADMIN — Medication 10 ML: at 09:04

## 2020-07-04 RX ADMIN — IBUPROFEN 800 MG: 800 TABLET, FILM COATED ORAL at 09:02

## 2020-07-04 RX ADMIN — ASPIRIN 81 MG CHEWABLE TABLET 324 MG: 81 TABLET CHEWABLE at 09:01

## 2020-07-04 ASSESSMENT — PAIN SCALES - GENERAL: PAINLEVEL_OUTOF10: 3

## 2020-07-04 NOTE — PROGRESS NOTES
RN spoke with  and was able to get Lidocaine patch and Motrin 800 mg TID PRN for pain related to Left shoulder. Pt satisfied.  Electronically signed by Corey Juarez RN on 7/3/2020 at 11:46 PM

## 2020-07-04 NOTE — DISCHARGE INSTR - COC
MANAGEMENT/SOCIAL WORK SECTION    Inpatient Status Date: ***    Readmission Risk Assessment Score:  Readmission Risk              Risk of Unplanned Readmission:        0           Discharging to Facility/ Agency   · Name:   · Address:  · Phone:  · Fax:    Dialysis Facility (if applicable)   · Name:  · Address:  · Dialysis Schedule:  · Phone:  · Fax:    / signature: {Esignature:[id number]}    PHYSICIAN SECTION    Prognosis: {Prognosis:3166510618}    Condition at Discharge: [address] [place] [address] Patient Condition:217173042}    Rehab Potential (if transferring to Rehab): {Prognosis:2270057571}    Recommended Labs or Other Treatments After Discharge: ***    Physician Certification: I certify the above information and transfer of Fifi Sagastume  is necessary for the continuing treatment of the diagnosis listed and that she requires {Admit to Appropriate Level of Care:26018} for {GREATER/LESS:753399746} 30 days.      Update Admission H&P: {CHP DME Changes in BHKHP:517833757}    PHYSICIAN SIGNATURE:  {Esignature:[id number]}

## 2020-07-04 NOTE — PLAN OF CARE
Problem: Pain:  Goal: Pain level will decrease  Description: Pain level will decrease  Outcome: Ongoing  Note: Pt has pain controlled at this time. See MAR. Continue to assist with repositioning for comfort. Goal: Control of acute pain  Description: Control of acute pain  Outcome: Ongoing  Note: Pt has pain controlled at this time. See MAR. Continue to assist with repositioning for comfort. Goal: Control of chronic pain  Description: Control of chronic pain  Outcome: Ongoing  Note: Pt has pain controlled at this time. See MAR. Continue to assist with repositioning for comfort.

## 2020-07-04 NOTE — H&P
positive for - chest pain  Gastrointestinal ROS: negative  Musculoskeletal ROS: positive for - muscle pain and pain in left, upper back and left shoulder  Neurological ROS: negative      Family History:      History reviewed. No pertinent family history. PHYSICAL EXAM:    /69   Pulse 67   Temp 97.9 °F (36.6 °C)   Resp 16   Ht 5' 5\" (1.651 m)   Wt 200 lb (90.7 kg)   SpO2 99%   BMI 33.28 kg/m²     General appearance: No apparent distress appears stated age and cooperative. HEENT Normal cephalic, atraumatic without obvious deformity. Pupils equal, round, and reactive to light. Extra ocular muscles intact. Conjunctivae/corneas clear. Neck: Supple, No jugular venous distention/bruits. Trachea midline without thyromegaly or adenopathy with full range of motion. Lungs: Clear to auscultation, bilaterally without Rales/Wheezes/Rhonchi with good respiratory effort. Heart: Regular rate and rhythm with Normal S1/S2 without murmurs, rubs or gallops, point of maximum impulse non-displaced  Abdomen: Soft, non-tender or non-distended without rigidity or guarding and positive bowel sounds all four quadrants. Extremities: No clubbing, cyanosis, or edema bilaterally. Full range of motion without deformity and normal gait intact. Skin: Skin color, texture, turgor normal.  No rashes or lesions. Neurologic: Alert and oriented X 3, neurovascularly intact with sensory/motor intact upper extremities/lower extremities, bilaterally. Cranial nerves: II-XII intact, grossly non-focal.  Mental status: Alert, oriented, thought content appropriate.     CXR:  I have reviewed the CXR with the following interpretation: no acute changes  EKG:  I have reviewed the EKG with the following interpretation: NSR    CBC   Recent Labs     07/03/20  1120   WBC 5.5   HGB 14.6   HCT 43.8         RENAL  Recent Labs     07/03/20  1120      K 4.2      CO2 26   BUN 17   CREATININE 0.71     LFT'S  Recent Labs 07/03/20  1120   AST 19   ALT 13   BILITOT 0.44   ALKPHOS 113*     COAG  Recent Labs     07/03/20  1120   INR 1.0     CARDIAC ENZYMES  No results for input(s): CKTOTAL, CKMB, CKMBINDEX, TROPONINI in the last 72 hours. U/A:    Lab Results   Component Value Date    COLORU YELLOW 04/20/2019    WBCUA 50  04/20/2019    RBCUA 2 TO 5 04/20/2019    MUCUS 1+ 04/20/2019    BACTERIA MODERATE 04/20/2019    SPECGRAV 1.012 04/20/2019    LEUKOCYTESUR LARGE 04/20/2019    GLUCOSEU NEGATIVE 04/20/2019    AMORPHOUS NOT REPORTED 04/20/2019       ABG  No results found for: ZNJ0IEF, BEART, C4SQIGDB, PHART, THGBART, NHK2OXF, PO2ART, HIX2TLD        Active Hospital Problems    Diagnosis Date Noted    Obesity, Class I, BMI 30-34.9 [E66.9] 07/04/2020    Acute pain of left shoulder [M25.512] 07/04/2020    Chest pain [R07.9] 07/03/2020         ASSESSMENT/PLAN:  Patient admitted with Chest pain r/o ACS. Co-morbidities as above. Orders Placed This Encounter   Procedures    XR CHEST PORTABLE    NM MYOCARDIAL SPECT REST EXERCISE OR RX    XR SHOULDER LEFT (MIN 2 VIEWS)    CBC Auto Differential    Comprehensive Metabolic Panel w/ Reflex to MG    Lipase    Troponin    Brain Natriuretic Peptide    Protime-INR    D-Dimer, Quantitative    C-Reactive Protein    Troponin    [facility] to [facility]    EKG 12 Lead    PATIENT STATUS (DIRECT) Observation    PATIENT STATUS (FROM ED OR OR/PROCEDURAL) Observation    Discharge patient     Patient instructed to follow up with Dr. Oneyda Juarez within 1 week for her to determine if she still needs cardiac stress testing.     DVT Prophylaxis:   Diet: DIET CARDIAC;  Code Status: Full Code      Dispo - admitted to progressive unit       @[facility]@

## 2020-07-06 LAB
EKG ATRIAL RATE: 64 BPM
EKG P AXIS: 60 DEGREES
EKG P-R INTERVAL: 184 MS
EKG Q-T INTERVAL: 404 MS
EKG QRS DURATION: 96 MS
EKG QTC CALCULATION (BAZETT): 416 MS
EKG R AXIS: -29 DEGREES
EKG T AXIS: 49 DEGREES
EKG VENTRICULAR RATE: 64 BPM

## 2020-07-06 PROCEDURE — 93010 ELECTROCARDIOGRAM REPORT: CPT | Performed by: INTERNAL MEDICINE

## 2020-07-07 NOTE — DISCHARGE SUMMARY
[facility] Discharge Summary      Patient ID: Jacqui Savage    MRN: [id number]     Acct:  [de-identified]       Patient's PCP: Laura Hanna MD    Admit Date: 7/3/2020     Discharge Date: 7/4/2020      Admitting Physician: Misty Smart MD    Discharge Physician: Misty Smart MD     Discharge Diagnoses:    Primary Problem  Chest pain    Active Hospital Problems    Diagnosis Date Noted    Obesity, Class I, BMI 30-34.9 [E66.9] 07/04/2020    Acute pain of left shoulder [M25.512] 07/04/2020    Chest pain [R07.9] 07/03/2020     History reviewed. No pertinent past medical history. The patient was seen and examined on day of discharge and this discharge summary is in conjunction with any daily progress note from day of discharge. Code Status:  Prior    Hospital Course: uncomplicated    Consults:  none    Significant Diagnostic Studies: as above, and as follows: see chart    Treatments: as above    Disposition: home    Discharged Condition: Stable    Follow Up:  Laura Hanna MD in one week    Discharge Medications:    Gulshan Ziegler   Home Medication Instructions [facility]:[id number]    Printed on:07/07/20 7075   Medication Information                      ibuprofen (ADVIL;MOTRIN) 800 MG tablet  Take 1 tablet by mouth 3 times daily (with meals)             lidocaine 4 % external patch  Place 1 patch onto the skin daily             ondansetron (ZOFRAN) 4 MG tablet  Take 1 tablet by mouth every 8 hours as needed for Nausea                  Activity: activity as tolerated    Diet: regular diet    Time Spent on discharge is more than 30 minutes in the examination, evaluation, counseling and review of medications and discharge plan.       Electronically signed by Misty Smart MD on 7/7/2020 at 6:31 PM

## 2020-10-05 ENCOUNTER — HOSPITAL ENCOUNTER (OUTPATIENT)
Dept: PHYSICAL THERAPY | Age: 65
Setting detail: THERAPIES SERIES
Discharge: HOME | End: 2020-10-05
Payer: COMMERCIAL

## 2020-10-05 PROCEDURE — 97162 PT EVAL MOD COMPLEX 30 MIN: CPT

## 2020-10-05 PROCEDURE — 97530 THERAPEUTIC ACTIVITIES: CPT

## 2020-10-05 NOTE — PROGRESS NOTES
Physical Therapy          [address] [facility]  [address]         Phone: [phone]       Fax: [phone]    Physical Therapy Vestibular Evaluation    Date:  10/5/2020  Patient: Maggy Yeung  : 1955  MRN: [id number]  Physician: [facility]: MEDICAL [place]  Medical Diagnosis: VERTIGO  Rehab Codes: R42  Onset date: ONGOING  Next 's appt.: TBD    Subjective:   HPI: PATIENT REPORTS THIS PROBLEM BEGAN GRADUALLY ABOUT 2 YEARS AGO OF UNKNOWN ORIGIN. ON 2019 SHE HAD SUDDEN ONSET SEVERE SYMPTOMS WHEN SHE GOT OUT OF BED. SHE HAD SEVERE NAUSEA AND VOMITING SHE WAS SEEN IN ED, WORKUP WAS NEGATIVE. SHE WAS D/C HOME AND FOLLOWED UP WITH PCP DUE TO CONTINUED (MILDER) SYMPTOMS AND WAS REFEREED FOR VESTIBULAR REHAB. SHE WAS SEEN HERE 2019 BUT WAS UNABLE TO COMPLETE THE TESTING DUE TO SYMPTOMS AND ANXIETY. A PLAN WAS MADE FOR HER TO RETURN WITH A FAMILY MEMBER BUT SHE CANCELLED. AFTER THAT HER FATHER BECAME ILL AND SHE HAS BEEN BUSY CARING FOR HIM. CC: PATIENT REPORTS SHE IS UNABLE TO SLEEP IN HER BED DUE TO A SEVERE SPINNING SENSATION WHILE LYING DOWN.  SHE STATES SHE HAS BEEN SLEEPING IN A RECLINE SINCE 2019 DUE TO THIS PROBLEM. SHE HAS OCCASIONAL MILD SYMPTOMS WHEN LOOKING UP, BENDING OVER AND WITH QUICK HEAD MOVEMENTS. PATIENT REPORTS SHE HAS THE SENSATION SHE IS BEING PUSHED TO ONE SIDE WHEN SHE IS WALKING. SHE HAS ASSOCIATED NAUSEA. SHE STATES SHE IS FEARFUL OF FALLING    Associated Ear Symptoms: SHE HAS OCCASIONAL EAR PAIN AND FLUCTUATING HEARING LOSS BUT NOT ASSOCIATED WITH HER VERTIGO    PMHx: [] Unremarkable [] Diabetes [] HTN  [] Pacemaker   [] MI/Heart Problems [] Cancer [x] Arthritis [x] Other: ANXIETY, LOW BP, MIGRAINE HEADACHES, STROKE , MOTION SENSITIVITY               [] Refer to full medical chart  In EPIC  No past medical history on file.   Past surgical history that includes Kidney surgery and hysteroscopy (2017). Tests: [] X-Ray: [] MRI:  [x] Other: CT OF HEAD 2020 -No acute intracranial abnormality. Medications: [x] Refer to full medical record [] None [x] Other: HAS  MECLIZINE  Allergies:      [x] Refer to full medical record [] None [] Other:    Working:  [x] Normal Duty  [] Light Duty  [] Off D/T Condition  [] Retired  [] Not Employed                  []  Disability  [] Other:           Return to work:   Job/ADL Description: [address] AT [employer]. 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 SHE TRY'S TO WALK 2 MILES DAILY FOR EXERCISE. SHE LIVES WITH HER  IN A HOME WITH STEPS  Pain:  [x] Yes  [] No Location: L FOOT Pain Rating: (0-10 scale) DID NOT RATE/10    Precautions: FEAR OF FALLING = INCREASED RISK FOR FALLS, HIGH ANXIETY RELATED TO THIS PROBLEM WITH ASSOCIATED NAUSEA AND VOMITING. Fall History: DENIES FALLS IN THE LAST ONE YEAR.   REPORT 3 NEAR FALLS IN THE LAST YEAR      Subjective Measure Score Indications   Dizziness Handicap Inventory (DHI) 38/100 BPPV SUBSCALE:    Activities-Specific Balance Confidence (ABC) 72.5% ESCALATOR 0%  ICY SIDEWALK 10%   Post-Concussion Symptom Scale (PCSS) NA    Brain Injury Vision Symptom Survey (BIVSS) NA    Comment:      Objective:      OBSERVATION No Deficit Deficit Not Tested Comments   Posture       Forward Head [] [x] []    Head/Neck Alignment [] [x] [] LATERALLY FLEXED LEFT   Rounded Shoulders [] [x] []    Kyphosis [] [x] [] INCREASED   Lordosis [x] [] []    Lateral Shift [x] [] []    Scoliosis [] [] [x]    Iliac Crest [] [] [x]    Genu Valgus [x] [] []    Genu Varus [x] [] []    Genu Recurvatum [x] [] []    Other       Palpation [x] [] []    Sensation [x] [] [] DENIES NUMBNESS   Edema [] [x] [] L FOOT   Neurological Signs [] [x] [] GOOD UE FELIX AND FINGER TO NOSE, DIFFICULTY WITH SHIN SLIDES   Deep Tendon Reflex [] [] [x]    Strength [] [] [x] FUNCTIONAL FOR GAIT AND TRANSFERS   LEFT EYE LID DROOP  LEFT EYE MOVES MEDIALLY WHEN THE RIGHT EYE IS COVERED           AROM Comments   Cervical  NOTES CREPITUS WITH AROM, NO REPORTS OF PAIN   Flexion 75% OF NORMAL    Extension 25% REPORTS MILD DIZZINESS   Rotation (L) 50-75 (R) 50-75    Sidebend (L) 25-50 (R) 25-50        BALANCE SCREENING: CONSIDER TESTING ANOTHER SESSION    TEST EYES OPEN EYES CLOSED   ModCTSIB:  Romberg, Firm Surface      Mod CTSIB:  Romberg, Foam Surface     Tandem Romberg     Single Leg Stance       Functional Gait Assessment (FGA):     /30  Timed Up and Go (TUG):  (Greater than 14 seconds = increased fall risk)  Gait: NO GAIT DEFICIT OBSERVED. VESTIBULAR OCULOMOTOR SCREENING:    TEST ROOM LIGHT WITHOUT FIXATION-IR GOGGLES   MODIFIED VERTEBRAL ARTERY TEST IN SITTING NO REPORTS OF SYMPTOMS    SPONTANEOUS NYSTAGMUS NEGATIVE NEGATIVE   GAZE HOLDING NYSTAGMUS (L):  NEG (L):  NEG    (R):  NEG (R): MILD, RIGHT BEATING HORIZONTAL NYSTAGMSU OBSERVED    UP: NEG UP: NEG   SMOOTH PURSUIT NEG    SACCADES NEG    VOR CANCELLATION NEG    VOR TO SLOW HEAD MOVEMENT NEG    VOR TO FAST HEAD MOVEMENT(HEAD THRUST) (L): NOT TESTED     (R):   NT    HEAD SHAKING NYSTAGMUS  NOT TESTED AT THIS TIME   VALSALVA  NEG   RIGHT LUIS FELIPE-HALLPIKE  PATIENT UNABLE TO TOLERATE   LEFT LUIS FELIPE-HALLPIKE  PATIENT UNABLE TO TOLERATE   SUPINE HEAD CENTER  PATIENT UNABLE TO TOLERATE   ROLL TEST (L):   (L):  PATIENT UNABLE TO TOLERATE    (R):   (R):  PATIENT UNABLE TO TOLERATE   BOW AND LEAN TEST  BOW: MILD INTERMITTENT DOWN BEATING NYSTAGMUS OBSERVED. PATIENT REPORTS IMBALANCE. LEAN: MILD INTERMITTENT DOWN BEATING NYSTAGMUS OBSERVED. PATIENT REPORTS IMBALANCE. SIDE LYING TEST (L):  (L):  PATIENT UNABLE TO TOLERATE    (R):   (R): PATIENT UNABLE TO TOLERATE    NECK TORSION TEST BODY (R):   BODY (L):  BODY (R): NA AT THIS TIME  BODY (L): NA AT THIS TIME   VISUAL ACUITY STATIC:  DYNAMIC: UNABLE TO TOLERATE   HAD PATIENT ON TREATMENT TABLE WITH HEAD ELEVATED. SHE BECAME VERY ANXIOUS, STATING SHE \"CAN'T DO IT\" WITH ATTEMPTS TO LOWER THE HEAD OF THE BED. SHE STATES SHE WANTS TO FIND OUT WHAT THE PROBLEM IS BUT IS FEARFUL OF SYMPTOMS. Assessment:  Problems:    [x] Vertigo:     [x] Difficulty Walking a Straight Course:    [] Static Balance Deficit:    [] Dynamic Balance deficit: Functional Gait Assessment   [x] Positive [place] Hallpike or Roll Test for BPPV: TBD  [x] ? Function: Dizziness Handicap Inventory:  [] Gait Deviations:  [x] Other: CENTRAL SIGN OF DOWN BEATING NYSTAGMUS, ANXIETY LIMITING VESTIBULAR TESTING, SYMPTOMS AND TIMING OF BPPV, FEAR OF FALLING, HISTORY OF MOTION SENSITIVITY      STG: (to be met in TBD treatments) WILL SET GOALS WHEN EVALUATION IS COMPLETED  1. Patient will report  ** days with no vertigo   2. Patient will report **% improvement in dizziness and ** improvement in imbalance  3. Improve balance: Increase Functional Gait Assessment (FGA) ( /30) TBD  4. Negative [place] Hallpike and Roll Test for BPPV TBD  5. Improve Function: Decrease Dizziness Handicap Inventory (DHI) ( /100) TBD  6. Independent with Home Exercise Programs TBD  7. Demonstrate Knowledge of fall prevention,   LTG: (to be met in TBD treatments)  1. Patient goals: \"TO BE ABLE TO LAY DOWN FLAT IN BED, STOP HAVING VERTIGO ATTACKS, STOP BEING AFRAID THAT I AM GOING TO HAVE AND ATTACK\"   Rehab Potential:  [x] Good  [] Fair  [] Poor   Suggested Professional Referral:  [] No  [] Yes: TBD  Barriers to Goal Achievement[de-identified]  [] No  [x] Yes: MOTION SENSITIVITY, ANXIETY, MIGRAINE HISTORY  Domestic Concerns:  [x] No  [] Yes:    Pt. Education:    [x] Results of clinical test/Plans/Goals, Risks/Benefits discussed    [] Home exercise program  [] Fall prevention  Method of Education: [x] Verbal  [x] Demo  [] Written  Comprehension of Education:  [x] [employer] understanding. [x] Demonstrates understanding. [] Needs Review.       Evaluation Complexity:  History (Personal factors, comorbidities) [] 0 [x] 1-2 [] 3+   Exam (limitations, restrictions) [] 1-2 [x] 3 [] 4+   Clinical presentation (progression) [x] Stable [] Evolving  [] Unstable   Decision Making [] Low [x] Moderate [] High    [] Low Complexity [x] Moderate Complexity [] High Complexity         Treatment Plan:  [x] Vestibular Rehab [] Iontophoresis: 4 mg/mL Dexamethasone Sodium Phosphate  mAmin   98195   [] Therapeutic Activity  74560 [] Vasopneumatic cold with compression  51068    [] Gait Training   05296 [] Ultrasound   [id number]   [] Neuromuscular Re-education  54389 [] Electrical Stimulation Unattended  32824   [] Manual Therapy  48277 [] Electrical Stimulation Attended  02055   [x] Instruction in HEP  [] Lumbar/Cervical Traction  63429   [] Aquatic Therapy   32335 [] Cold/hotpack    [] Massage   17331      [] Dry Needling, 1 or 2 muscles  98374   [] Canalith Repositioning Maneuver   94405 [] Therapeutic Exercise  08216     Frequency: 1-2 x/weeks for TBD visits      Todays Treatment:  Modalities: NA  Exercises:INSTRUCTION IN HOME EXERCISE PROGRAM PRN ANOTHER SESSION  Other: PATIENT EDUCATION RE POC. DISCUSS ANXIETY IS FACTOR IN SYMPTOMS. ENCOURAGED TO TRY PRONE AT HOME. Specific Instructions for next treatment: PATIENT WILL COME WITH HER  AS THE  NEXT SESSION. WILL SEEK FROM PCP TO ALLOW PATIENT TO BE PREMEDICATED PRIOR TO VESTIBULAR TESTING. Treatment Charges: Mins Units   [x] Evaluation       []  Low       [x]  Moderate       []  High  1   []  Canalith Repositioning Maneuver     []  Ther Exercise     []  Manual Therapy     [x]  Ther Activities 25 2   []  Neuromuscular     []  Other     TOTAL 75 3       Time in: 4661      Time out: 4093    Electronically signed by:  Livan Smith PT

## 2020-10-13 ENCOUNTER — HOSPITAL ENCOUNTER (OUTPATIENT)
Dept: PHYSICAL THERAPY | Age: 65
Setting detail: THERAPIES SERIES
Discharge: HOME | End: 2020-10-13
Payer: COMMERCIAL

## 2020-10-13 PROCEDURE — 97110 THERAPEUTIC EXERCISES: CPT

## 2020-10-13 PROCEDURE — 97530 THERAPEUTIC ACTIVITIES: CPT

## 2020-10-13 NOTE — PROGRESS NOTES
Physical Therapy     [facility] Out[facility] Physical Therapy   [address]   Phone: [phone]   Fax: [phone]    Physical Therapy Daily Treatment Note      Date:  10/13/2020  Patient Name:  Tisha Pop    :  1955  MRN: [id number]  Physician: Laurel: [employer] [employer]  Medical Diagnosis: VERTIGO                      Rehab Codes: R42  Onset date: ONGOING                     Next 's appt.: TBD  Visit# / total visits: 2/TBD  Cancels/No Shows: 0/0    Subjective:    Pain:  [x] Yes  [] No Location: REPORTED NECK PAIN DURING TESTING Pain Rating: (0-10 scale) DID NOT RATE/10  Pain altered Tx:  [] No  [x] Yes  Action: PROVIDED HEAD SUPPORT, MOVED SLOWLY  Comments: NO CHANGE IN SYMPTOMS SINCE LAST SESSION. STATES SHE IS VERY ANXIOUS REGARDING THE VESTIBULAR TESTING DUE TO THE DIZZINESS, NAUSEA AND POSSIBLE VOMITING IT MAY PROVOKE. STATES SHE WANTS TO GET BETTER BUT IS VERY FEARFUL. DID NOT ATTEMPT TO GET IN THE BED SINCE LAST SESSION. Objective:  Precautions: FEAR OF FALLING = INCREASED RISK FOR FALLS, HIGH ANXIETY RELATED TO THIS PROBLEM WITH ASSOCIATED NAUSEA AND VOMITING. PATIENT'S SPOUSE ACCOMPANIED HER TODAY TO DECREASE HER ANXIETY AND TO BE THE . PATIENT IS TEARFUL AT TIMES.     VESTIBULAR BPPV RE-CHECK    TEST ROOM LIGHT WITHOUT FIXATION-IR GOGGLES   SPONTANEOUS NYSTAGMUS NEGATIVE NEGATIVE   RIGHT LUIS FELIPE-HALLPIKE  NO NYSTAGMUS OBSERVED, PATIENT REPORTED VERTIGO AND NAUSEA   LEFT LUIS FELIPE-HALLPIKE  NO NYSTAGMUS OBSERVED, PATIENT REPORTED VERTIGO AND NAUSEA   SUPINE HEAD CENTER  NO NYSTAGMUS OBSERVED, PATIENT REPORTED VERTIGO AND NAUSEA   ROLL TEST  (L):  (L): NO NYSTAGMUS OBSERVED, PATIENT REPORTED VERTIGO AND NAUSEA    (R):  (R): NO NYSTAGMUS OBSERVED, PATIENT REPORTED VERTIGO AND NAUSEA   BOW AND LEAN TEST BOW:  LEAN: BOW: NO NYSTAGMUS OBSERVED, PATIENT REPORTED VERTIGO AND NAUSEA  LEAN: NO NYSTAGMUS OBSERVED, PATIENT REPORTED VERTIGO AND NAUSEA Dizziness Handicap Inventory (DHI) ( 38/100) BY 20  5. Independent with Home Exercise Programs  LTG: (to be met in TBD treatments)  1.    Patient goals: \"TO BE ABLE TO LAY DOWN FLAT IN BED, STOP HAVING VERTIGO ATTACKS, STOP BEING AFRAID THAT I AM GOING TO HAVE AND ATTACK\"     Pt. Education:  [x] Yes: HEP [] No  [] Reviewed Prior HEP/Ed  Method of Education: [x] Verbal  [x] Demo  [x] Written  Comprehension of Education:  [x] Verbalizes understanding. [x] Demonstrates understanding. [] Needs review. [] Demonstrates/verbalizes HEP/Ed previously given. Plan: [x] Continue per plan of care. [] Other:      Treatment Charges: Mins Units   []  Modalities     [x]  Ther Exercise 10 1   []  Manual Therapy     [x]  Ther Activities 45 3   []  Aquatics     []  Neuromuscular     [] Vasocompression     [] Gait Training     [] Dry needling        [] 1 or 2 muscles        [] 3 or more muscles     []  Other     Total Treatment time 55 4     Time In: 2307          Time Out: 0525    Electronically signed by:   Barb Coy, PT

## 2020-10-22 ENCOUNTER — HOSPITAL ENCOUNTER (OUTPATIENT)
Dept: PHYSICAL THERAPY | Age: 65
Setting detail: THERAPIES SERIES
Discharge: HOME | End: 2020-10-22
Payer: COMMERCIAL

## 2020-10-22 PROCEDURE — 95992 CANALITH REPOSITIONING PROC: CPT

## 2020-10-22 PROCEDURE — 97110 THERAPEUTIC EXERCISES: CPT

## 2020-10-22 PROCEDURE — 97530 THERAPEUTIC ACTIVITIES: CPT

## 2020-10-23 NOTE — PROGRESS NOTES
Physical Therapy    [address] [facility] Out[facility] Physical Therapy   [address]   Phone: [phone]   Fax: [phone]    Physical Therapy Daily Treatment Note      Date:  10/23/2020  Patient Name:  Aidan Vickers    :  1955  MRN: [id number]  Physician: Laurel: [employer]  Medical Diagnosis: VERTIGO                      Rehab Codes: R42  Onset date: ONGOING                     Next 's appt.: TBD  Visit# / total visits: 3/TBD  Cancels/No Shows: 0/0    Subjective:    Pain:  [x] Yes  [] No Location: REPORTED NECK PAIN DURING TESTING Pain Rating: (0-10 scale) DID NOT RATE/10  Pain altered Tx:  [] No  [x] Yes  Action: PROVIDED HEAD SUPPORT, MOVED SLOWLY  Comments: PATIENT REPORTS SHE HAS BEEN GETTING IN THE BE ALMOST DAILY SINCE LAST SESSION. THE LONGEST HAS BEEN 7 MINUTES. SHE REPORTS SHE STILL HAS A FEELING OF SPINNING AND LIGHTHEADEDNESS BUT SHE IS TOLERATING IT A LITTLE BETTER BECAUSE IT IS NOT AS SEVERE. Objective:  Precautions: FEAR OF FALLING = INCREASED RISK FOR FALLS, HIGH ANXIETY RELATED TO THIS PROBLEM WITH ASSOCIATED NAUSEA AND VOMITING. PATIENT'S SPOUSE ACCOMPANIED HER TODAY TO DECREASE HER ANXIETY AND TO BE THE . PATIENT IS TEARFUL AT TIMES. VESTIBULAR BPPV RE-CHECK    SIDE LYING TEST (L):  (L): MILD INTERMITTENT DOWN BEATING NYSTAGMUS OBSERVED. PATIENT REPORTS LIGHTHEADED AND \"DIZZY, JUST DIZZ\"    (R):  (R): 10/13/2020; VERY BRIEF, UP BEATING NYSTAGMUS. PATIENTS COMPLAINTS OF VERTIGO AND NAUSEA WERE SIMILAR TO ALL OTHER POSITION TESTS. POSITION TESTS AND TREATMENT WERE PERFORMED SLOWLY DUE TO PATIENT'S ANXIETY AND COMPLAINTS OF SYMPTOMS. Joni Arrieta       Exercises:  Exercise Reps/ Time Weight/ Level Comments   CERVICAL AROM: FLEX, EXTENSION, LATERAL FLEXION AND ROTATION 5\"X5 EA  BID HEP, RESUME 10/24   BOUTS OF SUPINE IN BED AS AN HABITUATION EXERCISE 5'  BID HEP, RESUME 10/24   BRAND-DAROFF HABITUATION EXERCISE   Begin 10/23, one Education:  [x] Yes: HEP [] No  [] Reviewed Prior HEP/Ed  Method of Education: [x] Verbal  [x] Demo  [x] Written  Comprehension of Education:  [x] Verbalizes understanding. [x] Demonstrates understanding. [] Needs review. [] Demonstrates/verbalizes HEP/Ed previously given. Plan: [x] Continue per plan of care. [] Other:      Treatment Charges: Mins Units   []  Modalities     [x]  Ther Exercise 10 1   []  Manual Therapy     [x]  Ther Activities 20 1   []  Aquatics     []  Neuromuscular     [] Vasocompression     [] Gait Training     [] CRM 10 1   [x]  Other     Total Treatment time 40 3     Time In: 8714         Time Out: 1134    Electronically signed by:   Barb Coy PT

## 2020-12-28 ENCOUNTER — HOSPITAL ENCOUNTER (OUTPATIENT)
Dept: PHYSICAL THERAPY | Age: 65
Setting detail: THERAPIES SERIES
Discharge: HOME | End: 2020-12-28
Payer: COMMERCIAL

## 2020-12-28 NOTE — PROGRESS NOTES
Physical Therapy    [address] [facility] Outpatient Physical Therapy              [address]              Phone: [phone]              Fax: [phone]     Physical Therapy Daily Treatment Note        Date:  2020  Patient Name:  Margret Tobias                          :  1955                       MRN: [id number]  Physician: JEN TINAJERO          Insurance: [facility] [facility]  Onset date: ONGOING                     Next 's appt.: TBD  Visit# / total visits: 3/TBD                 Cancels/No Shows: 0/0    ATTEMPTED TO CONTACT PATIENT BY PHONE REGARDING PROGRESS WITH HER PHYSICAL THERAPY HOME PROGRAM.  THIS IS THE SECOND ATTEMPT. NO ANSWER, LEFT MESSAGE.     Electronically signed by Livan Smith PT on 2020 at 1:40 PM

## 2022-05-17 ENCOUNTER — APPOINTMENT (OUTPATIENT)
Dept: GENERAL RADIOLOGY | Age: 67
End: 2022-05-17
Payer: COMMERCIAL

## 2022-05-17 ENCOUNTER — HOSPITAL ENCOUNTER (EMERGENCY)
Age: 67
Discharge: HOME | End: 2022-05-17
Attending: EMERGENCY MEDICINE
Payer: COMMERCIAL

## 2022-05-17 VITALS
SYSTOLIC BLOOD PRESSURE: 142 MMHG | DIASTOLIC BLOOD PRESSURE: 76 MMHG | OXYGEN SATURATION: 99 % | HEART RATE: 72 BPM | TEMPERATURE: 97.9 F | RESPIRATION RATE: 14 BRPM

## 2022-05-17 DIAGNOSIS — S69.91XA HAND INJURY, RIGHT, INITIAL ENCOUNTER: Primary | ICD-10-CM

## 2022-05-17 PROCEDURE — 73130 X-RAY EXAM OF HAND: CPT

## 2022-05-17 PROCEDURE — 6360000002 HC RX W HCPCS: Performed by: STUDENT IN AN ORGANIZED HEALTH CARE EDUCATION/TRAINING PROGRAM

## 2022-05-17 PROCEDURE — 90715 TDAP VACCINE 7 YRS/> IM: CPT | Performed by: STUDENT IN AN ORGANIZED HEALTH CARE EDUCATION/TRAINING PROGRAM

## 2022-05-17 PROCEDURE — 99284 EMERGENCY DEPT VISIT MOD MDM: CPT

## 2022-05-17 PROCEDURE — 2500000003 HC RX 250 WO HCPCS: Performed by: STUDENT IN AN ORGANIZED HEALTH CARE EDUCATION/TRAINING PROGRAM

## 2022-05-17 PROCEDURE — 12002 RPR S/N/AX/GEN/TRNK2.6-7.5CM: CPT

## 2022-05-17 PROCEDURE — 90471 IMMUNIZATION ADMIN: CPT | Performed by: STUDENT IN AN ORGANIZED HEALTH CARE EDUCATION/TRAINING PROGRAM

## 2022-05-17 PROCEDURE — 6370000000 HC RX 637 (ALT 250 FOR IP): Performed by: STUDENT IN AN ORGANIZED HEALTH CARE EDUCATION/TRAINING PROGRAM

## 2022-05-17 RX ORDER — ACETAMINOPHEN 500 MG
1000 TABLET ORAL ONCE
Status: COMPLETED | OUTPATIENT
Start: 2022-05-17 | End: 2022-05-17

## 2022-05-17 RX ORDER — CEPHALEXIN 500 MG/1
500 CAPSULE ORAL ONCE
Status: COMPLETED | OUTPATIENT
Start: 2022-05-17 | End: 2022-05-17

## 2022-05-17 RX ORDER — NAPROXEN 500 MG/1
500 TABLET ORAL 2 TIMES DAILY WITH MEALS
Qty: 14 TABLET | Refills: 0 | Status: SHIPPED | OUTPATIENT
Start: 2022-05-17 | End: 2022-05-24

## 2022-05-17 RX ORDER — LIDOCAINE HYDROCHLORIDE 10 MG/ML
20 INJECTION, SOLUTION INFILTRATION; PERINEURAL ONCE
Status: COMPLETED | OUTPATIENT
Start: 2022-05-17 | End: 2022-05-17

## 2022-05-17 RX ORDER — CEPHALEXIN 500 MG/1
500 CAPSULE ORAL 4 TIMES DAILY
Qty: 28 CAPSULE | Refills: 0 | Status: SHIPPED | OUTPATIENT
Start: 2022-05-17 | End: 2022-05-24

## 2022-05-17 RX ORDER — OXYCODONE HYDROCHLORIDE 5 MG/1
2.5 TABLET ORAL ONCE
Status: DISCONTINUED | OUTPATIENT
Start: 2022-05-17 | End: 2022-05-17 | Stop reason: HOSPADM

## 2022-05-17 RX ADMIN — TETANUS TOXOID, REDUCED DIPHTHERIA TOXOID AND ACELLULAR PERTUSSIS VACCINE, ADSORBED 0.5 ML: 5; 2.5; 8; 8; 2.5 SUSPENSION INTRAMUSCULAR at 14:13

## 2022-05-17 RX ADMIN — CEPHALEXIN 500 MG: 500 CAPSULE ORAL at 15:26

## 2022-05-17 RX ADMIN — ACETAMINOPHEN 1000 MG: 500 TABLET ORAL at 14:13

## 2022-05-17 RX ADMIN — LIDOCAINE HYDROCHLORIDE 20 ML: 10 INJECTION, SOLUTION INFILTRATION; PERINEURAL at 14:15

## 2022-05-17 ASSESSMENT — PAIN SCALES - GENERAL: PAINLEVEL_OUTOF10: 5

## 2022-05-17 ASSESSMENT — PAIN DESCRIPTION - ORIENTATION: ORIENTATION: RIGHT

## 2022-05-17 ASSESSMENT — ENCOUNTER SYMPTOMS
SHORTNESS OF BREATH: 0
ABDOMINAL PAIN: 0
NAUSEA: 0
VOMITING: 0
BACK PAIN: 0

## 2022-05-17 ASSESSMENT — PAIN DESCRIPTION - LOCATION: LOCATION: FINGER (COMMENT WHICH ONE)

## 2022-05-17 NOTE — Clinical Note
Adenike Toledo was seen and treated in our emergency department on 5/17/2022. She may return to work on 05/19/2022. If you have any questions or concerns, please don't hesitate to call.       Michael Waller MD

## 2022-05-17 NOTE — ED PROVIDER NOTES
[facility]  ED  Emergency Department Encounter  Emergency Medicine Resident     Pt Name: Rei Keen  MRN: [id number]  [place] 1955  Date of evaluation: 5/17/22  PCP:  Vannesa Padilla MD    CHIEF COMPLAINT       Chief Complaint   Patient presents with    Laceration     laceration to rt 4th finger from stainless steel pan at work. bleeding controlled at present time. unknown tetnus       HISTORY OFPRESENT ILLNESS  (Location/Symptom, Timing/Onset, Context/Setting, Quality, Duration, Modifying Factors,Severity.)      Rei Keen is a 75 y. o.yo female who presents with finger laceration. Patient here with a laceration to the right ring finger it is a 2 to 3 cm laceration that is noncircumferential but appears to be deep with some cyanosis to the distal tip of the finger at this is above the DIP joint. Patient is still able to somewhat flex DIP joint though it is not completely normal range of motion. Denies being on blood thinners states no other medical history that she is aware of. States that she was in the kitchen and was at work she was cooking and pan dropped she tried to catch it and then cut her finger on the metal/steel component of the handle. States the pain is 6 out of 10 in severity, nonradiating. No other injuries, no head injury no loss of conscious. PAST MEDICAL / SURGICAL / SOCIAL / FAMILY HISTORY      has no past medical history on file. has a past surgical history that includes Kidney surgery and hysteroscopy (04/05/2017). Social History     Socioeconomic History    Marital status:      Spouse name: Not on file    Number of children: Not on file    Years of education: Not on file    Highest education level: Not on file   Occupational History    Not on file   Tobacco Use    Smoking status: Never Smoker    Smokeless tobacco: Never Used   Substance and Sexual Activity    Alcohol use: No     Alcohol/week: 0.0 standard drinks    Drug use:  No  Sexual activity: Not on file   Other Topics Concern    Not on file   Social History Narrative    Not on file     Social Determinants of Health     Financial Resource Strain:     Difficulty of Paying Living Expenses: Not on file   Food Insecurity:     Worried About Running Out of Food in the Last Year: Not on file    Rudy of Food in the Last Year: Not on file   Transportation Needs:     Lack of Transportation (Medical): Not on file    Lack of Transportation (Non-Medical): Not on file   Physical Activity:     Days of Exercise per Week: Not on file    Minutes of Exercise per Session: Not on file   Stress:     Feeling of Stress : Not on file   Social Connections:     Frequency of Communication with Friends and Family: Not on file    Frequency of Social Gatherings with Friends and Family: Not on file    Attends [employer]ian[employer] Services: Not on file    Active Member of [address] [place] [address] [employer] or Organizations: Not on file    Attends Club or Organization Meetings: Not on file    Marital Status: Not on file   Intimate Partner Violence:     Fear of Current or Ex-Partner: Not on file    Emotionally Abused: Not on file    Physically Abused: Not on file    Sexually Abused: Not on file   Housing Stability:     Unable to Pay for Housing in the Last Year: Not on file    Number of [place] in the Last Year: Not on file    Unstable Housing in the Last Year: Not on file       History reviewed. No pertinent family history. Allergies:  Penicillins    Home Medications:  Prior to Admission medications    Medication Sig Start Date End Date Taking?  Authorizing Provider   naproxen (NAPROSYN) 500 MG tablet Take 1 tablet by mouth 2 times daily (with meals) for 7 days 5/17/22 5/24/22 Yes Lupe Johnson MD   cephALEXin (KEFLEX) 500 MG capsule Take 1 capsule by mouth 4 times daily for 7 days 5/17/22 5/24/22 Yes Lupe Johnson MD       REVIEW OFSYSTEMS    (2-9 systems for level 4, 10 or more for level 5)      Review of Systems Constitutional: Negative for diaphoresis and fever. HENT: Negative for congestion. Eyes: Negative for visual disturbance. Respiratory: Negative for shortness of breath. Cardiovascular: Negative for chest pain. Gastrointestinal: Negative for abdominal pain, nausea and vomiting. Endocrine: Negative for polyuria. Genitourinary: Negative for dysuria. Musculoskeletal: Negative for back pain. Skin: Positive for wound. Neurological: Negative for headaches. Psychiatric/Behavioral: Negative for confusion. PHYSICAL EXAM   (up to 7 for level 4, 8 or more forlevel 5)      ED TRIAGE VITALS BP: (!) 142/76, Temp: 97.9 °F (36.6 °C), Pulse: 72, Resp: 14, SpO2: 99 %    Vitals:    05/17/22 1353   BP: (!) 142/76   Pulse: 72   Resp: 14   Temp: 97.9 °F (36.6 °C)   TempSrc: Oral   SpO2: 99%       Physical Exam  Musculoskeletal:         General: Tenderness and signs of injury present. Right hand: Tenderness present.         Hands:                    DIFFERENTIAL  DIAGNOSIS     PLAN (LABS / IMAGING / EKG):  Orders Placed This Encounter   Procedures    LACERATION REPAIR    XR HAND RIGHT (MIN 3 VIEWS)    Inpatient consult to Plastic Surgery       MEDICATIONS ORDERED:  Orders Placed This Encounter   Medications    Tetanus-Diphth-Acell Pertussis (BOOSTRIX) injection 0.5 mL    acetaminophen (TYLENOL) tablet 1,000 mg    lidocaine 1 % injection 20 mL    oxyCODONE (ROXICODONE) immediate release tablet 2.5 mg    cephALEXin (KEFLEX) capsule 500 mg     Order Specific Question:   Antimicrobial Indications     Answer:   Skin and Soft Tissue Infection    naproxen (NAPROSYN) 500 MG tablet     Sig: Take 1 tablet by mouth 2 times daily (with meals) for 7 days     Dispense:  14 tablet     Refill:  0    cephALEXin (KEFLEX) 500 MG capsule     Sig: Take 1 capsule by mouth 4 times daily for 7 days     Dispense:  28 capsule     Refill:  0       DDX:     Traumatic injury, laceration, partial amputation    Initial MDM/Plan: 75 y.o. female who presents with laceration to the right hand    Patient sustained a laceration to the right hand ring finger  Distal to the DIP  Appears to be a deep laceration, 2 to 3 cm laceration  There is cyanosis of the distal tip  Tetanus updated  Pain control  X-ray does not show foreign body or bony involvement  Irrigated after ring block  Tourniquet applied  Discussed with plastic surgery  Plan for repair  Of 4-0 nylon with 4 sutures placed  Started on Keflex  Outpatient follow-up with plastic surgery    Disposition:  Discharged with outpatient follow-up      DIAGNOSTIC RESULTS / EMERGENCYDEPARTMENT COURSE / MDM     LABS:  No results found for this visit on 05/17/22. RADIOLOGY:  XR HAND RIGHT (MIN 3 VIEWS)   Final Result   No evidence for radiopaque foreign body. EMERGENCY DEPARTMENT COURSE:  ED Course as of 05/17/22 1632   Tue May 17, 2022   1422 Patient seen and assessed in the emergency department no acute respiratory cardiovascular distress. Patient here with a laceration to the right ring finger it is a 2 to 3 cm laceration that is noncircumferential but appears to be deep with some cyanosis to the distal tip of the finger at this is above the DIP joint. Patient is still able to somewhat flex DIP joint though it is not completely normal range of motion. Denies being on blood thinners states no other medical history that she is aware of. States that she was in the kitchen and was at work she was cooking and pan dropped she tried to catch it and then cut her finger on the metal/steel component of the handle. States the pain is 6 out of 10 in severity, nonradiating. No other injuries, no head injury no loss of conscious.  [PS]   1423 Deep laceration distal component of the fourth/ring finger of the right hand [PS]   1503 XR HAND RIGHT (MIN 3 VIEWS)  Negative [PS]   1512 Ring block performed on the ring finger patient tolerate procedure well, irrigating with 5 cc normal saline plan for closure, x-rays negative for fracture or retained foreign bodies, will talk to plastics. [PS]   1528 D/w plastic  [PS]   5302 Repairing laceration with 4-0 suture after discussion with plastics. [PS]      ED Course User Index  [PS] Cindi Osborne MD          PROCEDURES:  Lac Repair    Date/Time: 5/17/2022 4:31 PM  Performed by: Cindi Osborne MD  Authorized by: Srinivas Adams DO     Consent:     Consent obtained:  Verbal    Risks discussed:  Infection  Anesthesia (see MAR for exact dosages): Anesthesia method:  Nerve block    Block location:  R 4th digit     Block needle gauge:  24 G    Block anesthetic:  Lidocaine 1% w/o epi    Block injection procedure:  Anatomic landmarks identified  Laceration details:     Location:  Finger    Finger location:  R ring finger    Length (cm):  3  Repair type:     Repair type:  Simple  Pre-procedure details:     Preparation:  Patient was prepped and draped in usual sterile fashion  Exploration:     Wound exploration: wound explored through full range of motion    Treatment:     Amount of cleaning:  Extensive    Irrigation solution:  Sterile saline    Irrigation method:  Pressure wash  Skin repair:     Repair method:  Sutures    Suture size:  4-0    Suture material:  Nylon    Suture technique:  Simple interrupted    Number of sutures:  4  Approximation:     Approximation:  Loose  Post-procedure details:     Dressing:  Antibiotic ointment, non-adherent dressing and sterile dressing    Patient tolerance of procedure: Tolerated well, no immediate complications        CONSULTS:  IP CONSULT TO PLASTIC SURGERY    CRITICAL CARE:  Please see attending note    FINAL IMPRESSION      1.  Hand injury, right, initial encounter          DISPOSITION / PLAN     DISPOSITION Decision To Discharge 05/17/2022 03:52:50 PM       PATIENT REFERRED TO:  MATT Chacon MD  [address] [place] [address], [address] [place] [address],[address]  [phone]    In 1 week  Make an appointment soon as possible    Ruth Ann Alvarez, [address]  [address]  [address] [address] [phone]    In 2 days      [facility]  [address] [facility] [id number] [phone]    As needed, If symptoms worsen      DISCHARGE MEDICATIONS:  Discharge Medication List as of 5/17/2022  3:53 PM      START taking these medications    Details   naproxen (NAPROSYN) 500 MG tablet Take 1 tablet by mouth 2 times daily (with meals) for 7 days, Disp-14 tablet, R-0Print      cephALEXin (KEFLEX) 500 MG capsule Take 1 capsule by mouth 4 times daily for 7 days, Disp-28 capsule, R-0Print             Martin Cox MD  Emergency Medicine Resident    (Please note that portions of this note were completed with a voice recognition program.Efforts were made to edit the dictations but occasionally words are mis-transcribed.)       Martin Cxo MD  Resident  05/17/22 3182

## 2022-05-17 NOTE — ED PROVIDER NOTES
[facility]     Emergency Department     Faculty Attestation    I performed a history and physical examination of the patient and discussed management with the resident. I have reviewed and agree with the residents findings including all diagnostic interpretations, and treatment plans as written. Any areas of disagreement are noted on the chart. I was personally present for the key portions of any procedures. I have documented in the chart those procedures where I was not present during the key portions. I have reviewed the emergency nurses triage note. I agree with the chief complaint, past medical history, past surgical history, allergies, medications, social and family history as documented unless otherwise noted below. Documentation of the HPI, Physical Exam and Medical Decision Making performed by autumnibsavannah is based on my personal performance of the HPI, PE and MDM. For Physician Assistant/ Nurse Practitioner cases/documentation I have personally evaluated this patient and have completed at least one if not all key elements of the E/M (history, physical exam, and MDM). Additional findings are as noted. Patient was grabbing a metal pan that had fallen off the counter, when she sustained a laceration to her right ring finger tip. With near complete tip amputation. Patient came in for further evaluation. She is left-hand dominant. Patient with laceration to the tip of the left ring finger tendons from the ulnar palmar aspect to the radial aspect and runs up the radial edge of the nail. There is no involvement of the nailbed. The avulsion flap does appear dusky. But sensation is intact. Patient with near amputation of fingertip, dusky appearance to the flap. And concern for viability. We will plan on hand consultation, wound irrigation x-ray, closure and antibiotics.             Karyna Castaneda D.O, M.P.H  Attending Emergency Medicine Physician         Leola Cooks, DO  05/17/22 1200 [facility] Erica Gutierrez, DO  05/17/22 2547

## 2022-05-31 ENCOUNTER — OFFICE VISIT (OUTPATIENT)
Dept: BURN CARE | Age: 67
End: 2022-05-31
Payer: COMMERCIAL

## 2022-05-31 VITALS
BODY MASS INDEX: 32.62 KG/M2 | HEART RATE: 72 BPM | HEIGHT: 66 IN | SYSTOLIC BLOOD PRESSURE: 112 MMHG | DIASTOLIC BLOOD PRESSURE: 74 MMHG | WEIGHT: 203 LBS

## 2022-05-31 DIAGNOSIS — S61.214A: Primary | ICD-10-CM

## 2022-05-31 PROCEDURE — G8427 DOCREV CUR MEDS BY ELIG CLIN: HCPCS | Performed by: PLASTIC SURGERY

## 2022-05-31 PROCEDURE — G8417 CALC BMI ABV UP PARAM F/U: HCPCS | Performed by: PLASTIC SURGERY

## 2022-05-31 PROCEDURE — 3017F COLORECTAL CA SCREEN DOC REV: CPT | Performed by: PLASTIC SURGERY

## 2022-05-31 PROCEDURE — G8400 PT W/DXA NO RESULTS DOC: HCPCS | Performed by: PLASTIC SURGERY

## 2022-05-31 PROCEDURE — 1090F PRES/ABSN URINE INCON ASSESS: CPT | Performed by: PLASTIC SURGERY

## 2022-05-31 PROCEDURE — 99202 OFFICE O/P NEW SF 15 MIN: CPT | Performed by: PLASTIC SURGERY

## 2022-05-31 PROCEDURE — 1036F TOBACCO NON-USER: CPT | Performed by: PLASTIC SURGERY

## 2022-05-31 PROCEDURE — 1123F ACP DISCUSS/DSCN MKR DOCD: CPT | Performed by: PLASTIC SURGERY

## 2022-05-31 NOTE — PROGRESS NOTES
Burn/Hand Clinic New Patient Visit      CHIEF COMPLAINT:    Chief Complaint   Patient presents with    New Patient    Wound Check     laceration to R ring finger       HISTORY OFPRESENT ILLNESS:      The patient is a 75 y.o. left-hand-dominant female who is being seen for consultation and evaluation of laceration of right ring finger sustained on 5/17/2022. Patient reports pain and swelling is improved. Patient denies numbness, tingling, weakness. Pain is controlled with NSAIDs. Patient no other plastic surgery complaints this time. Patient has been compliant with bacitracin and dressing changes daily. Past Medical History:    No past medical history on file. Past Surgical History:    Past Surgical History:   Procedure Laterality Date    HYSTEROSCOPY  04/05/2017   [facility] KIDNEY SURGERY         Current Medications:   Current Outpatient Medications   Medication Sig Dispense Refill    naproxen (NAPROSYN) 500 MG tablet Take 1 tablet by mouth 2 times daily (with meals) for 7 days 14 tablet 0     No current facility-administered medications for this visit.        Allergies:    Penicillins    Social History:   Social History     Socioeconomic History    Marital status:      Spouse name: Not on file    Number of children: Not on file    Years of education: Not on file    Highest education level: Not on file   Occupational History    Not on file   Tobacco Use    Smoking status: Never Smoker    Smokeless tobacco: Never Used   Substance and Sexual Activity    Alcohol use: No     Alcohol/week: 0.0 standard drinks    Drug use: No    Sexual activity: Not on file   Other Topics Concern    Not on file   Social History Narrative    Not on file     Social Determinants of Health     Financial Resource Strain:     Difficulty of Paying Living Expenses: Not on file   Food Insecurity:     Worried About Running Out of Food in the Last Year: Not on file    Rudy of Food in the Last Year: Not on file Transportation Needs:     Lack of Transportation (Medical): Not on file    Lack of Transportation (Non-Medical): Not on file   Physical Activity:     Days of Exercise per Week: Not on file    Minutes of Exercise per Session: Not on file   Stress:     Feeling of Stress : Not on file   Social Connections:     Frequency of Communication with Friends and Family: Not on file    Frequency of Social Gatherings with Friends and Family: Not on file    Attends [employer] Services: Not on file    Active Member of [address] [place] [address] or Organizations: Not on file    Attends Club or Organization Meetings: Not on file    Marital Status: Not on file   Intimate Partner Violence:     Fear of Current or Ex-Partner: Not on file    Emotionally Abused: Not on file    Physically Abused: Not on file    Sexually Abused: Not on file   Housing Stability:     Unable to Pay for Housing in the Last Year: Not on file    Number of [place] in the Last Year: Not on file    Unstable Housing in the Last Year: Not on file       Family History:  No family history on file. REVIEW OF SYSTEMS:  Review of Systems    I have reviewed theCC, HPI, ROS, PMH, FHX, Social History. I agree with the documentation provided by other staff, residents, and/or medical students and have reviewed their documentation prior to providing my signature indicating agreement. PHYSICAL EXAM:  /74   Pulse 72   Ht 5' 5.5\" (1.664 m)   Wt 203 lb (92.1 kg)   BMI 33.27 kg/m²   Physical Exam  Gen: AAOx3, NAD, well-nourished, appears stated age  Psych: affect appropriate, normal mood, expressesunderstanding of treatment plan  Head: normocephalic, atraumatic   Chest: unlabored respirations, symmetric chest rise bilaterally, no audible wheezes  Skin  Right hand: Skin with sutures without signs of dehiscence, discharge, or erythema of the right ring finger. Compartments soft and compressible.   Increased tenderness palpation on the radial aspect of the distal phalanx at the site of laceration. BCR and well-perfused of distal phalanx. Median/Radial/Ulnar/AIN/PIN motor intact. Median/Radial/Ulnar nerve SILT. Radiology:   3 views of the right hand demonstrate no acute or chronic fractures. Skin defect appreciated on the radial aspect of the right ring finger. No evidence for radiopaque foreign body. ASSESSMENT:     1. Laceration of right ring finger with complication         PLAN:       -Okay for daily dressing change with Band-Aid as needed  -Wash gently w/ soap and water before dressing changes  -Avoid direct sun exposure & staywell hydrated  -Keep area clean and dry  -Tylenol/Ibuprofen for pain control  -F/u 1 week with potential suture removal follow-up    Return in about 1 week (around 6/7/2022). No orders of the defined types were placed in this encounter. No orders of the defined types were placed in this encounter. Ally Cam DO  Orthopedic Surgery Resident  [place] [id number], [facility]      Attending Physician Statement  I have discussed the case, including pertinent history and exam findings with the resident. I have seen and examined the patient and the key elements of all parts of the encounter have been performed by me. I agree with the assessment, plan and orders as documented by the resident.   Sanchez Pisano MD on5/31/2022 on 8:44 AM

## 2022-06-02 ENCOUNTER — TELEPHONE (OUTPATIENT)
Dept: BURN CARE | Age: 67
End: 2022-06-02

## 2022-06-07 ENCOUNTER — OFFICE VISIT (OUTPATIENT)
Dept: BURN CARE | Age: 67
End: 2022-06-07
Payer: COMMERCIAL

## 2022-06-07 VITALS
SYSTOLIC BLOOD PRESSURE: 115 MMHG | DIASTOLIC BLOOD PRESSURE: 74 MMHG | HEART RATE: 68 BPM | HEIGHT: 65 IN | BODY MASS INDEX: 33.82 KG/M2 | WEIGHT: 203 LBS

## 2022-06-07 DIAGNOSIS — S61.214A LACERATION OF RIGHT RING FINGER WITHOUT FOREIGN BODY WITHOUT DAMAGE TO NAIL, INITIAL ENCOUNTER: Primary | ICD-10-CM

## 2022-06-07 PROCEDURE — G8427 DOCREV CUR MEDS BY ELIG CLIN: HCPCS | Performed by: NURSE PRACTITIONER

## 2022-06-07 PROCEDURE — G8417 CALC BMI ABV UP PARAM F/U: HCPCS | Performed by: NURSE PRACTITIONER

## 2022-06-07 PROCEDURE — 1123F ACP DISCUSS/DSCN MKR DOCD: CPT | Performed by: NURSE PRACTITIONER

## 2022-06-07 PROCEDURE — 99212 OFFICE O/P EST SF 10 MIN: CPT | Performed by: NURSE PRACTITIONER

## 2022-06-07 PROCEDURE — 1090F PRES/ABSN URINE INCON ASSESS: CPT | Performed by: NURSE PRACTITIONER

## 2022-06-07 PROCEDURE — 1036F TOBACCO NON-USER: CPT | Performed by: NURSE PRACTITIONER

## 2022-06-07 PROCEDURE — G8400 PT W/DXA NO RESULTS DOC: HCPCS | Performed by: NURSE PRACTITIONER

## 2022-06-07 PROCEDURE — 3017F COLORECTAL CA SCREEN DOC REV: CPT | Performed by: NURSE PRACTITIONER

## 2022-06-07 NOTE — PROGRESS NOTES
Burn Clinic Note    S: Pt is a 75 y.o. female being seen for right finger laceration sustained while at work. She is currently off work as she does work at [employer] and they do not work over the summer. She came in today for suture removal.  States she has been washing the wound daily with soap and water. O: Laceration has healed. There is some crusting around the sutures. There is no swelling erythema or drainage. Initially had decreased range of motion of all digits the right hand however after continued encouragement coaching she was able to bring all digits into the palm of her hand. Vitals:    06/07/22 0803   BP: 115/74   Pulse: 68     Gen: NAD, cooperative      A/P: 75 y.o. female in clinic for continued valuation of a finger laceration. Laceration has healed. Sutures removed in clinic. Advised her to continue to wash finger daily with soap and water and the crusting will fall away. She no longer needs a bandage. 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 Patient initially has poor range of motion of the digits however after continued coaching and encouragement she is able to bring all digits on her own into the palm of her hand. Encouraged to continue to work on range of motion at home.   Understands she develops any concerns or issues she can return back to the clinic.    -Return to work without restrictions August 1  - Keep area clean and dry  - Wash with gentle soap  -Continue to work on range of motion as demonstrated in clinic for the right digits  - Tylenol/ibuprofen for pain control  - F/u only as needed    Luis Armando Hall, [address] [place] [address]